# Patient Record
Sex: FEMALE | Employment: UNEMPLOYED | URBAN - METROPOLITAN AREA
[De-identification: names, ages, dates, MRNs, and addresses within clinical notes are randomized per-mention and may not be internally consistent; named-entity substitution may affect disease eponyms.]

---

## 2020-01-30 ENCOUNTER — OFFICE VISIT (OUTPATIENT)
Dept: FAMILY MEDICINE CLINIC | Age: 45
End: 2020-01-30

## 2020-01-30 VITALS
RESPIRATION RATE: 16 BRPM | SYSTOLIC BLOOD PRESSURE: 112 MMHG | OXYGEN SATURATION: 97 % | TEMPERATURE: 97 F | DIASTOLIC BLOOD PRESSURE: 80 MMHG | HEART RATE: 77 BPM

## 2020-01-30 DIAGNOSIS — I10 ESSENTIAL HYPERTENSION: ICD-10-CM

## 2020-01-30 DIAGNOSIS — G80.9 CEREBRAL PALSY, UNSPECIFIED TYPE (HCC): ICD-10-CM

## 2020-01-30 DIAGNOSIS — D50.9 IRON DEFICIENCY ANEMIA, UNSPECIFIED IRON DEFICIENCY ANEMIA TYPE: Primary | ICD-10-CM

## 2020-01-30 RX ORDER — HYDROCHLOROTHIAZIDE 25 MG/1
25 TABLET ORAL DAILY
COMMUNITY
End: 2020-10-02 | Stop reason: ALTCHOICE

## 2020-01-30 RX ORDER — POLYETHYLENE GLYCOL 3350 17 G/17G
17 POWDER, FOR SOLUTION ORAL DAILY
COMMUNITY

## 2020-01-30 RX ORDER — OLANZAPINE 10 MG/1
10 TABLET ORAL
COMMUNITY
End: 2021-02-02

## 2020-01-30 RX ORDER — FLUVOXAMINE MALEATE 25 MG/1
25 TABLET ORAL 2 TIMES DAILY
COMMUNITY
End: 2021-02-02

## 2020-01-30 RX ORDER — PEDIATRIC MULTIVITAMIN NO.17
1 TABLET,CHEWABLE ORAL DAILY
COMMUNITY

## 2020-01-30 RX ORDER — PHENOL/SODIUM PHENOLATE
20 AEROSOL, SPRAY (ML) MUCOUS MEMBRANE DAILY
COMMUNITY

## 2020-01-30 NOTE — PROGRESS NOTES
Assessment/Plan:    Diagnoses and all orders for this visit:    1. Iron deficiency anemia, unspecified iron deficiency anemia type    2. Essential hypertension    3. Cerebral palsy, unspecified type [facility])        The plan was discussed with the patient. The patient verbalized understanding and is in agreement with the plan. All medication potential side effects were discussed with the patient. Health Maintenance:   Health Maintenance   Topic Date Due    DTaP/Tdap/Td series (1 - Tdap) 08/24/1985    PAP AKA CERVICAL CYTOLOGY  06/20/2019    Influenza Age 5 to Adult  08/01/2019    Pneumococcal 0-64 years  Aged 389 Deena Sawyer is a 38 y.o.  female and presents with New Patient     Subjective:  Pt is here to establish care. He is a resident of [facility]. Has CP, in wheelchair. Has iron def anemia (new dx) and 10lb wt loss (over several months). Seeing GI for this issue, Hank Babb. Has constipation, which is treated with miralax and lactulose. Seeing heme/onc.      htn - on hctz. bp is good. ROS: per staff  Constitutional: + recent weight change. No f/c. Skin: No rashes, change in nails/hair, itching   HENT:  No hearing loss. No nasal congestion/discharge. Respiratory: No cough/sputum, dyspnea, wheezing. Gastointestinal:  No constipation/diarrhea. No melena/rectal bleeding. Heme: + h/o anemia. No easy bleeding/bruising. PMH:  Past Medical History:   Diagnosis Date    Anxiety     A[facility]ic [facility] [address]     Autism     Hydrocephalus ([facility] [address]     OCD (obsessive compulsive disorder)        PSH:  History reviewed. No pertinent surgical history.      SH:  Social History     Tobacco Use    Smoking status: Never Smoker    Smokeless tobacco: Never Used   Substance Use Topics    Alcohol use: No    Drug use: Never       FH:  Family History   Family history unknown: Yes       Medications/Allergies:    Current Outpatient Medications:     pediatric multivitamins (ANIMAL SHAPE VITAMINS) chewable tablet, Take 1 Tab by mouth daily. , Disp: , Rfl:     OLANZapine (ZYPREXA) 10 mg tablet, Take 10 mg by mouth nightly., Disp: , Rfl:     fluvoxaMINE (LUVOX) 25 mg tablet, Take 25 mg by mouth every evening., Disp: , Rfl:     Omeprazole delayed release (PRILOSEC D/R) 20 mg tablet, Take 20 mg by mouth daily. , Disp: , Rfl:     polyethylene glycol (MIRALAX) 17 gram packet, Take 17 g by mouth daily. , Disp: , Rfl:     hydroCHLOROthiazide (HYDRODIURIL) 25 mg tablet, Take 25 mg by mouth daily. , Disp: , Rfl:     LACTULOSE PO, Take  by mouth., Disp: , Rfl:     HYDROcodone-acetaminophen (NORCO) 7.5-325 mg per tablet, Take 1 Tab by mouth every six (6) hours as needed for Pain. Max Daily Amount: 4 Tabs., Disp: 20 Tab, Rfl: 0    ibuprofen (MOTRIN) 200 mg tablet, Take 3 Tabs by mouth every six (6) hours as needed for Pain., Disp: 20 Tab, Rfl: 0    multivitamin (ONE A DAY) tablet, Take 1 Tab by mouth daily. , Disp: , Rfl:     LORazepam (ATIVAN) 1 mg tablet, Take  by mouth every four (4) hours as needed for Anxiety. , Disp: , Rfl:   No Known Allergies    Objective:  Visit Vitals  /80 (BP 1 Location: Left arm, BP Patient Position: Sitting)   Pulse 77   Temp 97 °F (36.1 °C) (Oral)   Resp 16   LMP  (LMP Unknown)   SpO2 97%      Constitutional: Well developed, nourished, no distress, alert   HENT: Would not cooperate for exam   Eyes: Conjunctiva normal. PERRL. Cardiovascular: S1, S2.  RRR. No murmurs/rubs. No thrills palpated. Pulmonary/Chest Wall: No abnormalities on inspection. Clear to auscultation bilaterally. No wheezing/rhonchi. Normal effort. GI: Soft, nontender, nondistended. Normal active bowel sounds.

## 2020-01-30 NOTE — PROGRESS NOTES
Jewel Burdick is a 38 y.o. female (: 1975) presenting to address:    Chief Complaint   Patient presents with    New Patient       Vitals:    20 1323   BP: 112/80   Pulse: 77   Resp: 16   Temp: 97 °F (36.1 °C)   TempSrc: Oral   SpO2: 97%       Hearing/Vision:   No exam data present    Learning Assessment:     Learning Assessment 2020   PRIMARY LEARNER Patient   BARRIERS PRIMARY LEARNER COGNITIVE   PRIMARY LANGUAGE ENGLISH   LEARNER PREFERENCE PRIMARY VIDEOS   ANSWERED BY Caregiver   RELATIONSHIP OTHER     Depression Screening:     3 most recent PHQ Screens 2020   Little interest or pleasure in doing things Not at all   Feeling down, depressed, irritable, or hopeless Not at all   Total Score PHQ 2 0     Fall Risk Assessment:     Fall Risk Assessment, last 12 mths 2020   Able to walk? No     Abuse Screening:     Abuse Screening Questionnaire 2020   Do you ever feel afraid of your partner? N   Are you in a relationship with someone who physically or mentally threatens you? N   Is it safe for you to go home? Y     Coordination of Care Questionaire:   1. Have you been to the ER, urgent care clinic since your last visit? Hospitalized since your last visit? NO    2. Have you seen or consulted any other health care providers outside of the [address] [place] [address] since your last visit? Include any pap smears or colon screening. YES, Hematology    Advanced Directive:   1. Do you have an Advanced Directive? NO    2. Would you like information on Advanced Directives?  NO

## 2020-08-17 ENCOUNTER — VIRTUAL VISIT (OUTPATIENT)
Dept: FAMILY MEDICINE CLINIC | Age: 45
End: 2020-08-17

## 2020-08-17 DIAGNOSIS — R10.9 ABDOMINAL PAIN, UNSPECIFIED ABDOMINAL LOCATION: Primary | ICD-10-CM

## 2020-08-17 DIAGNOSIS — I10 ESSENTIAL HYPERTENSION: ICD-10-CM

## 2020-08-17 DIAGNOSIS — R00.0 TACHYCARDIA: ICD-10-CM

## 2020-08-17 DIAGNOSIS — D50.9 IRON DEFICIENCY ANEMIA, UNSPECIFIED IRON DEFICIENCY ANEMIA TYPE: ICD-10-CM

## 2020-08-17 DIAGNOSIS — R11.2 NON-INTRACTABLE VOMITING WITH NAUSEA, UNSPECIFIED VOMITING TYPE: ICD-10-CM

## 2020-08-17 NOTE — PROGRESS NOTES
Neelima Chaudhari is a 38 y.o. female who was seen by synchronous (real-time) audio-video technology on 8/17/2020 for Vomiting and Hypertension      Assessment & Plan:   Diagnoses and all orders for this visit:    1. Abdominal pain, unspecified abdominal location,   2. Non-intractable vomiting with nausea, unspecified vomiting type,   3. Tachycardia-  ddx includes gastroenteritis vs covid vs ileus. Recommend ER for IVF, labs and imaging. 4. Essential hypertension- elevated today due to above  -     CBC W/O DIFF; Future  -     METABOLIC PANEL, COMPREHENSIVE; Future  -     LIPID PANEL; Future    5. Iron deficiency anemia, unspecified iron deficiency anemia type  -     CBC W/O DIFF; Future  -     IRON PROFILE; Future          Subjective:   Pt of [facility] with CP. History presented by staff due to pt's mental impairment. HTN - on hctz. Due for labs. bp running 144/106. Running high x 1 day. Her HR is 138. Pt refused her meds this morning. Last night she started vomiting. No fever. Nursing reports good bowel sounds. Has h/o ileus 2 years ago. No consipation/diarrhea. Nursing reports abd feels firm on L side. Prior to Admission medications    Medication Sig Start Date End Date Taking? Authorizing Provider   pediatric multivitamins (ANIMAL SHAPE VITAMINS) chewable tablet Take 1 Tab by mouth daily. Provider, Historical   OLANZapine (ZYPREXA) 10 mg tablet Take 10 mg by mouth nightly. Provider, Historical   fluvoxaMINE (LUVOX) 25 mg tablet Take 25 mg by mouth every evening. Provider, Historical   Omeprazole delayed release (PRILOSEC D/R) 20 mg tablet Take 20 mg by mouth daily. Provider, Historical   polyethylene glycol (MIRALAX) 17 gram packet Take 17 g by mouth daily. Provider, Historical   hydroCHLOROthiazide (HYDRODIURIL) 25 mg tablet Take 25 mg by mouth daily. Provider, Historical   LACTULOSE PO Take  by mouth.     Provider, Historical     Patient Active Problem List   Diagnosis Code  Essential hypertension I10    Iron deficiency anemia D50.9    Cerebral palsy (HCC) G80.9       Review of Systems   Gastrointestinal: Positive for abdominal pain and vomiting. Objective:   No flowsheet data found. [INSTRUCTIONS:  \"[x]\" Indicates a positive item  \"[]\" Indicates a negative item  -- DELETE ALL ITEMS NOT EXAMINED]    Constitutional: [x] Appears well-developed and well-nourished [x] No apparent distress      [] Abnormal -     Mental status: [x] Alert and awake  [x] Oriented to person/place/time [x] Able to follow commands    [] Abnormal -     Eyes:   EOM    [x]  Normal    [] Abnormal -   Sclera  [x]  Normal    [] Abnormal -          Discharge [x]  None visible   [] Abnormal -     HENT: [] Normocephalic, atraumatic  [] Abnormal -   [] Mouth/Throat: Mucous membranes are moist    External Ears [x] Normal  [] Abnormal -    Neck: [x] No visualized mass [] Abnormal -     Pulmonary/Chest: [x] Respiratory effort normal   [x] No visualized signs of difficulty breathing or respiratory distress        [] Abnormal -      Musculoskeletal:   [] Normal gait with no signs of ataxia         [] Normal range of motion of neck        [] Abnormal -     Neurological:        [] No Facial Asymmetry (Cranial nerve 7 motor function) (limited exam due to video visit)          [] No gaze palsy        [] Abnormal -          Skin:        [] No significant exanthematous lesions or discoloration noted on facial skin         [] Abnormal -            Psychiatric:       [] Normal Affect [] Abnormal -        [] No Hallucinations    Other pertinent observable physical exam findings:-    Abd: fullness on L side, per nursing    We discussed the expected course, resolution and complications of the diagnosis(es) in detail. Medication risks, benefits, costs, interactions, and alternatives were discussed as indicated. I advised her to contact the office if her condition worsens, changes or fails to improve as anticipated.  She expressed understanding with the diagnosis(es) and plan. [employer], who was evaluated through a patient-initiated, synchronous (real-time) audio-video encounter, and/or her healthcare decision maker, is aware that it is a billable service, with coverage as determined by her insurance carrier. She provided verbal consent to proceed: Yes, and patient identification was verified. It was conducted pursuant to the emergency declaration under the [address] [place] [address] and the [employer] and [employer] Act. A caregiver was present when appropriate. Ability to conduct physical exam was limited. I was at home. The patient was at home.       Timmy Pugh MD

## 2020-08-25 PROBLEM — N13.9 OBSTRUCTIVE UROPATHY: Status: ACTIVE | Noted: 2020-08-25

## 2020-09-04 ENCOUNTER — VIRTUAL VISIT (OUTPATIENT)
Dept: FAMILY MEDICINE CLINIC | Age: 45
End: 2020-09-04

## 2020-09-04 DIAGNOSIS — A41.51 SEPSIS DUE TO ESCHERICHIA COLI, UNSPECIFIED WHETHER ACUTE ORGAN DYSFUNCTION PRESENT (HCC): ICD-10-CM

## 2020-09-04 DIAGNOSIS — N13.9 OBSTRUCTIVE UROPATHY: Primary | ICD-10-CM

## 2020-09-04 DIAGNOSIS — N20.0 NEPHROLITHIASIS: ICD-10-CM

## 2020-09-04 NOTE — PROGRESS NOTES
Angelo Anthony is a 54 y.o. female who was seen by synchronous (real-time) audio-video technology on 9/4/2020 for Hospital Follow Up      [address] [place], [place] [address]way:   Diagnoses and all orders for this visit:    1. Obstructive uropathy    2. Sepsis due to Escherichia coli, unspecified whether acute organ dysfunction present ([address]    3. Nephrolithiasis        -much improved. F/u with urology as scheduled. Pt now at baseline. Subjective:     Pt in hospital 8/17-8/26 for e coli sepsis from UTI due to obstructive nephrolithiasis with hydronephrosis. Underwent stent placement. Treated with rocephin. Has urology appt 9/9 for stent removal.  No longer having abd pain per caretaker. Eating and voiding normally. Prior to Admission medications    Medication Sig Start Date End Date Taking? Authorizing Provider   pediatric multivitamins (ANIMAL SHAPE VITAMINS) chewable tablet Take 1 Tab by mouth daily. Provider, Historical   OLANZapine (ZYPREXA) 10 mg tablet Take 10 mg by mouth nightly. Provider, Historical   fluvoxaMINE (LUVOX) 25 mg tablet Take 25 mg by mouth every evening. Provider, Historical   Omeprazole delayed release (PRILOSEC D/R) 20 mg tablet Take 20 mg by mouth daily. Provider, Historical   polyethylene glycol (MIRALAX) 17 gram packet Take 17 g by mouth daily. Provider, Historical   hydroCHLOROthiazide (HYDRODIURIL) 25 mg tablet Take 25 mg by mouth daily. Provider, Historical   LACTULOSE PO Take  by mouth. Provider, Historical     Patient Active Problem List   Diagnosis Code    Essential hypertension I10    Iron deficiency anemia D50.9    Cerebral palsy ([address] G80.9    Obstructive uropathy N13.9       Review of Systems   Constitutional: Negative for chills and fever. Genitourinary: Negative.         Objective:     Patient-Reported Vitals 8/17/2020   Patient-Reported Pulse 138   Patient-Reported Temperature 97.8   Patient-Reported SpO2 96   Patient-Reported Systolic  949   Patient-Reported Diastolic 365        [INSTRUCTIONS:  \"[x]\" Indicates a positive item  \"[]\" Indicates a negative item  -- DELETE ALL ITEMS NOT EXAMINED]    Constitutional: [x] Appears well-developed and well-nourished [x] No apparent distress      [] Abnormal -     Mental status: [x] Alert and awake  [x] Oriented to person/place/time [x] Able to follow commands    [] Abnormal -     Eyes:   EOM    [x]  Normal    [] Abnormal -   Sclera  [x]  Normal    [] Abnormal -          Discharge [x]  None visible   [] Abnormal -     HENT: [x] Normocephalic, atraumatic  [] Abnormal -   [x] Mouth/Throat: Mucous membranes are moist    External Ears [x] Normal  [] Abnormal -    Neck: [x] No visualized mass [] Abnormal -     Pulmonary/Chest: [x] Respiratory effort normal   [x] No visualized signs of difficulty breathing or respiratory distress        [] Abnormal -      Musculoskeletal:   [] Normal gait with no signs of ataxia         [] Normal range of motion of neck        [] Abnormal -     Neurological:        [] No Facial Asymmetry (Cranial nerve 7 motor function) (limited exam due to video visit)          [] No gaze palsy        [] Abnormal -          Skin:        [] No significant exanthematous lesions or discoloration noted on facial skin         [] Abnormal -            Psychiatric:       [x] Normal Affect [] Abnormal -        [x] No Hallucinations    Other pertinent observable physical exam findings:-        We discussed the expected course, resolution and complications of the diagnosis(es) in detail. Medication risks, benefits, costs, interactions, and alternatives were discussed as indicated. I advised her to contact the office if her condition worsens, changes or fails to improve as anticipated. She expressed understanding with the diagnosis(es) and plan.        [employer], who was evaluated through a patient-initiated, synchronous (real-time) audio-video encounter, and/or her healthcare decision maker, is aware that it is a billable service, with coverage as determined by her insurance carrier. She provided verbal consent to proceed: Yes, and patient identification was verified. It was conducted pursuant to the emergency declaration under the [address] [place] [address] authority and the [employer] Act. A caregiver was present when appropriate. Ability to conduct physical exam was limited. I was at home. The patient was at home.       Obed Boudreaux MD

## 2020-10-02 ENCOUNTER — TELEPHONE (OUTPATIENT)
Dept: FAMILY MEDICINE CLINIC | Age: 45
End: 2020-10-02

## 2020-10-02 RX ORDER — LOSARTAN POTASSIUM 25 MG/1
25 TABLET ORAL DAILY
COMMUNITY
Start: 2020-10-02

## 2020-10-02 NOTE — TELEPHONE ENCOUNTER
Received vm from [facility] stating that patient was discharged from [facility] yesterday on 10/1/20 and was d/c with 2 blood pressure medications on her medication list HCTZ 25 mg and Losartan 25 mg however they need clarification if she is suppose to take both or not and if not please fax something over stating to discontinue one of them. After reviewing her chart it looks like today you d/c the HCTZ on med list and add the losartan. However on discharge summary it states to continue HCTZ and does not mention the losartan. Please advise.      Fax number [phone]

## 2020-10-02 NOTE — TELEPHONE ENCOUNTER
The message I got was that she had been on losartan 25mg, not hctz 25mg. In fax pile, there should be the order to d/c hctz 25mg. No change to losartan.

## 2020-10-06 ENCOUNTER — VIRTUAL VISIT (OUTPATIENT)
Dept: FAMILY MEDICINE CLINIC | Age: 45
End: 2020-10-06
Payer: MEDICAID

## 2020-10-06 DIAGNOSIS — A41.51 SEPSIS DUE TO ESCHERICHIA COLI WITH ACUTE ORGAN DYSFUNCTION WITHOUT SEPTIC SHOCK, UNSPECIFIED TYPE (HCC): Primary | ICD-10-CM

## 2020-10-06 DIAGNOSIS — U07.1 COVID-19: ICD-10-CM

## 2020-10-06 DIAGNOSIS — R65.20 SEPSIS DUE TO ESCHERICHIA COLI WITH ACUTE ORGAN DYSFUNCTION WITHOUT SEPTIC SHOCK, UNSPECIFIED TYPE (HCC): Primary | ICD-10-CM

## 2020-10-06 DIAGNOSIS — R63.0 LACK OF APPETITE: ICD-10-CM

## 2020-10-06 PROCEDURE — 99213 OFFICE O/P EST LOW 20 MIN: CPT | Performed by: INTERNAL MEDICINE

## 2020-10-06 NOTE — PROGRESS NOTES
Porter Viveros is a 54 y.o. female who was seen by synchronous (real-time) audio-video technology on 10/6/2020 for Hospital Follow Up        [address] [place] [address]:   Diagnoses and all orders for this visit:    1. Sepsis due to Escherichia coli with acute organ dysfunction without septic shock, unspecified type ([facility] [address]    2. COVID-19- suspect lack of taste/smell contributing to #3.    3. Lack of appetite  - boost supplements qid prn poor intake      Subjective:   Pt recently hospitalized for sepsis 2/2 UTI/ureterolithiasis (pansensitive e coli). Pt underwent stent placement and treated with keflex (finishes 10/9). Also positive for covid. Pt initially hypotensive and bp meds were held. Per caretaker, her vitals are stable. However, pt has lack of appetite. She is putting food in mouth but then not swallowing. No episodes of choking. Prior to Admission medications    Medication Sig Start Date End Date Taking? Authorizing Provider   losartan (COZAAR) 25 mg tablet Take 1 Tab by mouth daily. 10/2/20   Theron Gonsalez MD   zonisamide (Zonegran) 100 mg capsule Take  by mouth daily. Provider, Historical   glycopyrrolate (ROBINUL) 1 mg tablet Take 1 mg by mouth three (3) times daily. Provider, Historical   ferrous sulfate 325 mg (65 mg iron) tablet Take  by mouth Daily (before breakfast). Provider, Historical   pediatric multivitamins (ANIMAL SHAPE VITAMINS) chewable tablet Take 1 Tab by mouth daily. Provider, Historical   OLANZapine (ZYPREXA) 10 mg tablet Take 10 mg by mouth nightly. Provider, Historical   fluvoxaMINE (LUVOX) 25 mg tablet Take 25 mg by mouth every evening. Provider, Historical   Omeprazole delayed release (PRILOSEC D/R) 20 mg tablet Take 20 mg by mouth daily. Provider, Historical   polyethylene glycol (MIRALAX) 17 gram packet Take 17 g by mouth daily. Provider, Historical   LACTULOSE PO Take  by mouth.     Provider, Historical     Patient Active Problem List   Diagnosis Code  Essential hypertension I10    Iron deficiency anemia D50.9    Cerebral palsy (HCC) G80.9    Obstructive uropathy N13.9       Review of Systems   Constitutional: Negative for chills and fever. Respiratory: Negative for cough, shortness of breath and wheezing. Gastrointestinal: Negative for diarrhea.        Objective:     Patient-Reported Vitals 8/17/2020   Patient-Reported Pulse 138   Patient-Reported Temperature 97.8   Patient-Reported SpO2 96   Patient-Reported Systolic  236   Patient-Reported Diastolic 882        [INSTRUCTIONS:  \"[x]\" Indicates a positive item  \"[]\" Indicates a negative item  -- DELETE ALL ITEMS NOT EXAMINED]    Constitutional: [x] Appears well-developed and well-nourished [x] No apparent distress      [] Abnormal -     Mental status: [x] Alert and awake  [x] Oriented to person/place/time [x] Able to follow commands    [] Abnormal -     Eyes:   EOM    [x]  Normal    [] Abnormal -   Sclera  [x]  Normal    [] Abnormal -          Discharge [x]  None visible   [] Abnormal -     HENT: [x] Normocephalic, atraumatic  [] Abnormal -   [x] Mouth/Throat: Mucous membranes are moist    External Ears [x] Normal  [] Abnormal -    Neck: [x] No visualized mass [] Abnormal -     Pulmonary/Chest: [x] Respiratory effort normal   [x] No visualized signs of difficulty breathing or respiratory distress        [] Abnormal -      Musculoskeletal:   [] Normal gait with no signs of ataxia         [] Normal range of motion of neck        [] Abnormal -     Neurological:        [] No Facial Asymmetry (Cranial nerve 7 motor function) (limited exam due to video visit)          [] No gaze palsy        [] Abnormal -          Skin:        [] No significant exanthematous lesions or discoloration noted on facial skin         [] Abnormal -            Psychiatric:       [x] Normal Affect [] Abnormal -        [x] No Hallucinations    Other pertinent observable physical exam findings:-        We discussed the expected course, resolution and complications of the diagnosis(es) in detail. Medication risks, benefits, costs, interactions, and alternatives were discussed as indicated. I advised her to contact the office if her condition worsens, changes or fails to improve as anticipated. She expressed understanding with the diagnosis(es) and plan. [employer], who was evaluated through a patient-initiated, synchronous (real-time) audio-video encounter, and/or her healthcare decision maker, is aware that it is a billable service, with coverage as determined by her insurance carrier. She provided verbal consent to proceed: Yes, and patient identification was verified. It was conducted pursuant to the emergency declaration under the [facility], [address] [place] [address] authority and the [employer] Act. A caregiver was present when appropriate. Ability to conduct physical exam was limited. I was in the office. The patient was at home.       Valeriano Carrasco MD

## 2020-11-25 ENCOUNTER — HOSPITAL ENCOUNTER (OUTPATIENT)
Dept: PREADMISSION TESTING | Age: 45
Discharge: HOME | End: 2020-11-25
Payer: MEDICAID

## 2020-11-25 ENCOUNTER — TRANSCRIBE ORDER (OUTPATIENT)
Dept: REGISTRATION | Age: 45
End: 2020-11-25

## 2020-11-25 DIAGNOSIS — Z01.812 BLOOD TESTS PRIOR TO TREATMENT OR PROCEDURE: Primary | ICD-10-CM

## 2020-11-25 DIAGNOSIS — Z01.812 BLOOD TESTS PRIOR TO TREATMENT OR PROCEDURE: ICD-10-CM

## 2020-11-25 DIAGNOSIS — Z20.828 EXPOSURE TO SARS-ASSOCIATED CORONAVIRUS: ICD-10-CM

## 2020-11-25 PROCEDURE — 87635 SARS-COV-2 COVID-19 AMP PRB: CPT

## 2020-11-27 LAB — SARS-COV-2, COV2NT: NOT DETECTED

## 2020-12-02 ENCOUNTER — HOSPITAL ENCOUNTER (OUTPATIENT)
Age: 45
Setting detail: OUTPATIENT SURGERY
Discharge: HOME | End: 2020-12-02
Attending: UROLOGY | Admitting: UROLOGY
Payer: MEDICAID

## 2020-12-02 ENCOUNTER — ANESTHESIA EVENT (OUTPATIENT)
Dept: SURGERY | Age: 45
End: 2020-12-02
Payer: MEDICAID

## 2020-12-02 ENCOUNTER — ANESTHESIA (OUTPATIENT)
Dept: SURGERY | Age: 45
End: 2020-12-02
Payer: MEDICAID

## 2020-12-02 ENCOUNTER — APPOINTMENT (OUTPATIENT)
Dept: GENERAL RADIOLOGY | Age: 45
End: 2020-12-02
Attending: UROLOGY
Payer: MEDICAID

## 2020-12-02 VITALS
TEMPERATURE: 98.5 F | RESPIRATION RATE: 16 BRPM | HEART RATE: 85 BPM | OXYGEN SATURATION: 99 % | SYSTOLIC BLOOD PRESSURE: 101 MMHG | DIASTOLIC BLOOD PRESSURE: 50 MMHG

## 2020-12-02 PROCEDURE — 74018 RADEX ABDOMEN 1 VIEW: CPT

## 2020-12-02 RX ORDER — CEFAZOLIN SODIUM 2 G/50ML
2 SOLUTION INTRAVENOUS
Status: DISCONTINUED | OUTPATIENT
Start: 2020-12-02 | End: 2020-12-02 | Stop reason: HOSPADM

## 2020-12-02 NOTE — PERIOP NOTES
Patient is with her caregiver Derick Grimeskorimagda, who stays she is her nurse, caregiver and legal guardian. Patient lives in a group home. Will continue monitoring.

## 2020-12-02 NOTE — PROGRESS NOTES
Progress Note    Patient had E coli UTI diagnosed prior to ESWL procedure. Unfortunately antibiotic therapy was just started yesterday. I feel to treat this patient's stone today will put her at risk for Sepsis. She has been rescheduled multiple times, but that is not good excuse to adhere to standard practices. Will proceed with ESWL next week as previously planned while on Augmentin. .  Today's procedure has been cancelled.     Alisson Torre MD

## 2020-12-10 ENCOUNTER — ANESTHESIA EVENT (OUTPATIENT)
Dept: SURGERY | Age: 45
End: 2020-12-10
Payer: MEDICAID

## 2020-12-11 ENCOUNTER — HOSPITAL ENCOUNTER (OUTPATIENT)
Age: 45
Setting detail: OUTPATIENT SURGERY
Discharge: NURSING, REHAB OR LONG-TERM CARE | End: 2020-12-11
Attending: UROLOGY | Admitting: UROLOGY
Payer: MEDICAID

## 2020-12-11 ENCOUNTER — APPOINTMENT (OUTPATIENT)
Dept: GENERAL RADIOLOGY | Age: 45
End: 2020-12-11
Attending: UROLOGY
Payer: MEDICAID

## 2020-12-11 ENCOUNTER — ANESTHESIA (OUTPATIENT)
Dept: SURGERY | Age: 45
End: 2020-12-11
Payer: MEDICAID

## 2020-12-11 VITALS
OXYGEN SATURATION: 100 % | WEIGHT: 155.7 LBS | HEIGHT: 61 IN | SYSTOLIC BLOOD PRESSURE: 131 MMHG | RESPIRATION RATE: 18 BRPM | BODY MASS INDEX: 29.39 KG/M2 | HEART RATE: 69 BPM | DIASTOLIC BLOOD PRESSURE: 92 MMHG | TEMPERATURE: 98.1 F

## 2020-12-11 LAB
APPEARANCE UR: ABNORMAL
APPEARANCE UR: CLEAR
BACTERIA URNS QL MICRO: ABNORMAL /HPF
BACTERIA URNS QL MICRO: ABNORMAL /HPF
BILIRUB UR QL: NEGATIVE
BILIRUB UR QL: NEGATIVE
CAOX CRY URNS QL MICRO: ABNORMAL
COLOR UR: YELLOW
COLOR UR: YELLOW
EPITH CASTS URNS QL MICRO: ABNORMAL /LPF (ref 0–5)
EPITH CASTS URNS QL MICRO: ABNORMAL /LPF (ref 0–5)
GLUCOSE UR STRIP.AUTO-MCNC: NEGATIVE MG/DL
GLUCOSE UR STRIP.AUTO-MCNC: NEGATIVE MG/DL
HCG SERPL QL: NEGATIVE
HGB UR QL STRIP: ABNORMAL
HGB UR QL STRIP: ABNORMAL
KETONES UR QL STRIP.AUTO: NEGATIVE MG/DL
KETONES UR QL STRIP.AUTO: NEGATIVE MG/DL
LEUKOCYTE ESTERASE UR QL STRIP.AUTO: ABNORMAL
LEUKOCYTE ESTERASE UR QL STRIP.AUTO: ABNORMAL
NITRITE UR QL STRIP.AUTO: POSITIVE
NITRITE UR QL STRIP.AUTO: POSITIVE
PH UR STRIP: 6.5 [PH] (ref 5–8)
PH UR STRIP: 7 [PH] (ref 5–8)
PROT UR STRIP-MCNC: ABNORMAL MG/DL
PROT UR STRIP-MCNC: NEGATIVE MG/DL
RBC #/AREA URNS HPF: ABNORMAL /HPF (ref 0–5)
RBC #/AREA URNS HPF: ABNORMAL /HPF (ref 0–5)
SP GR UR REFRACTOMETRY: 1.01 (ref 1–1.03)
SP GR UR REFRACTOMETRY: 1.01 (ref 1–1.03)
UROBILINOGEN UR QL STRIP.AUTO: 0.2 EU/DL (ref 0.2–1)
UROBILINOGEN UR QL STRIP.AUTO: 0.2 EU/DL (ref 0.2–1)
WBC URNS QL MICRO: ABNORMAL /HPF (ref 0–4)
WBC URNS QL MICRO: ABNORMAL /HPF (ref 0–4)

## 2020-12-11 PROCEDURE — 87186 SC STD MICRODIL/AGAR DIL: CPT

## 2020-12-11 PROCEDURE — 87086 URINE CULTURE/COLONY COUNT: CPT

## 2020-12-11 PROCEDURE — 81001 URINALYSIS AUTO W/SCOPE: CPT

## 2020-12-11 PROCEDURE — 74018 RADEX ABDOMEN 1 VIEW: CPT

## 2020-12-11 PROCEDURE — 84703 CHORIONIC GONADOTROPIN ASSAY: CPT

## 2020-12-11 PROCEDURE — 87077 CULTURE AEROBIC IDENTIFY: CPT

## 2020-12-11 RX ORDER — SODIUM CHLORIDE, SODIUM LACTATE, POTASSIUM CHLORIDE, CALCIUM CHLORIDE 600; 310; 30; 20 MG/100ML; MG/100ML; MG/100ML; MG/100ML
25 INJECTION, SOLUTION INTRAVENOUS CONTINUOUS
Status: DISCONTINUED | OUTPATIENT
Start: 2020-12-11 | End: 2020-12-11 | Stop reason: HOSPADM

## 2020-12-11 RX ORDER — FAMOTIDINE 20 MG/1
20 TABLET, FILM COATED ORAL ONCE
Status: DISCONTINUED | OUTPATIENT
Start: 2020-12-11 | End: 2020-12-11 | Stop reason: HOSPADM

## 2020-12-11 RX ORDER — CEFAZOLIN SODIUM 2 G/50ML
2 SOLUTION INTRAVENOUS ONCE
Status: DISCONTINUED | OUTPATIENT
Start: 2020-12-11 | End: 2020-12-11 | Stop reason: HOSPADM

## 2020-12-11 NOTE — PROGRESS NOTES
Procedure cancelled. Prior Urine culture +, completed course of Augmentin. First straight cath + with epis. Personally repeated 2nd straight cath, remains positive. Will send for repeat culture. Will likely need IV ABX and URS while on Abx. Pt's family will be contacted regarding followup.  [address] notified    Dixie Hernandez MD

## 2020-12-11 NOTE — H&P
Follow-up Visit              Encounter Diagnoses       ICD-10-CM ICD-9-CM   1. Obstructive uropathy  N13.9 599.60            ASSESSMENT & PLAN:   1. Ureteral Stone               CT Abd Pelv 8/17/20: Mild right hydronephrosis and hydroureter secondary to an obstructing calculus in the right mid ureter measuring 8 x 7 x 8 mm. S/p cystoscopy, right retrograde, right ureteral stent on 8/17/20. KUB 8/22/20: Right ureteral stent appears unchanged, adequately positioned              Reviewed KUB 10/22/20: 8 mm right proximal stone. Stent in place              Discussed that she needs the stone treated prior to stent removal. Recommend right ESWL to treat- risks and benefits reviewed               Will remove the stent after stone is treated. Will need consent from POA for surgery       12/11/20 Update:    Pt seen and examined. KUB - stone visible. Completed course of Augmentin yesterday. Straight cathed today for UA and culture. If negative, will proceed with right ESWL. Consent signed. Preop abx ordered. All family questions answered. Meg Montez MD             Follow-up and Dispositions                   Chief Complaint   Patient presents with    Kidney Stone       stent removal          HPI: Para Ramírez is a 54 y.o. PATIENT REFUSED female with h/o cerebral palsy, intellectual disability (profound), hydrocephalus, autism who presents today in follow up for an established diagnosis of ureteral stone. Pt was admitted to the hospital from 8/17-8/26/20 for sepsis secondary to E.coli UTI and obstructing stone. CT scan on 8/17/20 showed mild right hydronephrosis and hydroureter secondary to an obstructing calculus in the right mid ureter measuring 8 x 7 x 8 mm. Also noted high-grade small bowel obstruction. Other findings outlined below. S/p cysto placement of NG tube, cystoscopy, right retrograde, right ureteral stent on 8/17/20.  UTI was treated with Rocephin. Pt was last seen on 9/9/20. Caregiver is present at appointment. Per caregiver pt reports some discomfort with stent in place. She has complete incontinence.      No bothersome Sx reported at today's visit.      Pt is not on any blood thinner.      Pt ambulates with a wheelchair.         LABS / IMAGING:  CT Abd Pelv W Cont 8/17/20  KIDNEYS: Left kidney is unremarkable. Right kidney demonstrates mild hydronephrosis and hydroureter secondary to an obstructing calculus in the right mid ureter measuring 8 x 7 x 8mm. No other ureteral calculi seen.     IMPRESSION    There is high-grade small bowel obstruction with transition zone in the left lower quadrant without pneumatosis or portal venous gas or free air. Mild right hydronephrosis and hydroureter secondary to an obstructing calculus in the right mid ureter measuring 8 x 7 x 8 mm. Complex left adnexal cyst and a 6 cm septated right adnexal cyst.     AUA Assessment Score:   ;    AUA Bother Rating:              Current Outpatient Medications   Medication Sig Dispense Refill    losartan (COZAAR) 25 mg tablet Take 1 Tab by mouth daily.        zonisamide (Zonegran) 100 mg capsule Take  by mouth daily.        glycopyrrolate (ROBINUL) 1 mg tablet Take 1 mg by mouth three (3) times daily.        ferrous sulfate 325 mg (65 mg iron) tablet Take  by mouth Daily (before breakfast).         pediatric multivitamins (ANIMAL SHAPE VITAMINS) chewable tablet Take 1 Tab by mouth daily.        OLANZapine (ZYPREXA) 10 mg tablet Take 10 mg by mouth nightly.        fluvoxaMINE (LUVOX) 25 mg tablet Take 25 mg by mouth every evening.        Omeprazole delayed release (PRILOSEC D/R) 20 mg tablet Take 20 mg by mouth daily.        polyethylene glycol (MIRALAX) 17 gram packet Take 17 g by mouth daily.        LACTULOSE PO Take  by mouth.             All:  No Known Allergies          Past Medical History:   Diagnosis Date    Anxiety      Ataxic cerebral palsy (HCC)    Autism      Hydrocephalus (HCC)      Kidney stone      OCD (obsessive compulsive disorder)      UTI (urinary tract infection)                 Past Surgical History:   Procedure Laterality Date    CYSTOSCOPY,INSERT URETERAL STENT             Review of Systems  Constitutional: Fever: No  Skin: Rash: No  HEENT: Hearing difficulty: No  Eyes: Blurred vision: No  Cardiovascular: Chest pain: No  Respiratory: Shortness of breath: No  Gastrointestinal: Nausea/vomiting: No  Musculoskeletal: Back pain: No  Neurological: Weakness: No  Psychological: Memory loss: No  Comments/additional findings:      Any elements of the PMFSHx, ROS, or preliminary elements of the HPI that were entered by a medical assistant have been reviewed in full.     PHYSICAL EXAM:    Visit Vitals  Ht 5' 1\" (1.549 m)   Wt 168 lb (76.2 kg)   BMI 31.74 kg/m²      Constitutional: WDWN, Pleasant and appropriate affect, No acute distress. CV:  No peripheral swelling noted  Respiratory: No respiratory distress or difficulties  Skin: No jaundice. Neuro/Psych:  Alert and oriented x 3. Affect appropriate.         Body mass index is 31.74 kg/m². Patient's BMI is out of the normal parameters. Information about BMI was given and patient was advised to follow-up with their PCP for further management. Labs Today:      Results for orders placed or performed in visit on 10/22/20   AMB POC XRAY ABDOMEN 1 VIEW     Impression     [facility] [address] [place] [address] Road in office today, 10/23/2020     Previous KUB: YES     Reason for Examination:     Patient presents with:  Kidney Stone: stent removal         Renal silhouette is well visualized.        Renal stones seen? NO        Ureteral stone? YES       Bowel gas pattern normal?  YES  Acute osseus abnormality? NO  Phleboliths seen in pelvis? NO     Impression:   Right ureteral stent in place.  8 mm stone right proximal ureter.        Read by:   Ricardo Booth M.D., F.A.C.S. on 10/23/2020

## 2020-12-11 NOTE — PERIOP NOTES
Pre-Op Summary    Pt arrived via medical transport and is disoriented. Patient with unsteady gait with wheelchair assistive devices. Unable to start peripheral IV due to patient being uncooperative. Dr. Clau Dooley informed. IV to be started in OR. Visit Vitals  BP (!) 131/92 (BP 1 Location: Left arm, BP Patient Position: At rest)   Pulse 69   Temp 98.1 °F (36.7 °C)   Resp 18   Ht 5' 1\" (1.549 m)   Wt 70.6 kg (155 lb 11.2 oz)   SpO2 100%   BMI 29.42 kg/m²         Patients belongings are located [facility]. Patient's point of contact is caregiver/nurse Lizbeth Diego  and their contact number is: [phone]. They will be leaving and coming back. They are able to receive medication information. They will be their ride home.

## 2020-12-11 NOTE — ANESTHESIA PREPROCEDURE EVALUATION
Anesthetic History   No history of anesthetic complications            Review of Systems / Medical History  Patient summary reviewed and pertinent labs reviewed    Pulmonary  Within defined limits                 Neuro/Psych     seizures         Cardiovascular    Hypertension              Exercise tolerance: <4 METS  Comments: CP    Unable to start IV 2* pt combativeness. Plan today is mask induction to allow IV start.      GI/Hepatic/Renal     GERD: well controlled           Endo/Other             Other Findings              Physical Exam    Airway  Mallampati: IV  TM Distance: 4 - 6 cm  Neck ROM: decreased range of motion   Mouth opening: Diminished (comment)     Cardiovascular    Rhythm: regular  Rate: normal         Dental    Dentition: Poor dentition     Pulmonary  Breath sounds clear to auscultation               Abdominal  GI exam deferred       Other Findings            Anesthetic Plan    ASA: 3  Anesthesia type: general          Induction: Intravenous and Inhalational  Anesthetic plan and risks discussed with: Patient and Healthcare power of

## 2020-12-14 LAB
BACTERIA SPEC CULT: ABNORMAL
CC UR VC: ABNORMAL
SERVICE CMNT-IMP: ABNORMAL

## 2021-01-29 ENCOUNTER — HOSPITAL ENCOUNTER (OUTPATIENT)
Dept: PREADMISSION TESTING | Age: 46
Discharge: HOME | End: 2021-01-29
Payer: MEDICAID

## 2021-01-29 ENCOUNTER — TRANSCRIBE ORDER (OUTPATIENT)
Dept: REGISTRATION | Age: 46
End: 2021-01-29

## 2021-01-29 DIAGNOSIS — Z20.828 EXPOSURE TO SARS-ASSOCIATED CORONAVIRUS: ICD-10-CM

## 2021-01-29 DIAGNOSIS — Z01.812 BLOOD TESTS PRIOR TO TREATMENT OR PROCEDURE: Primary | ICD-10-CM

## 2021-01-29 DIAGNOSIS — Z01.812 BLOOD TESTS PRIOR TO TREATMENT OR PROCEDURE: ICD-10-CM

## 2021-01-29 PROCEDURE — U0003 INFECTIOUS AGENT DETECTION BY NUCLEIC ACID (DNA OR RNA); SEVERE ACUTE RESPIRATORY SYNDROME CORONAVIRUS 2 (SARS-COV-2) (CORONAVIRUS DISEASE [COVID-19]), AMPLIFIED PROBE TECHNIQUE, MAKING USE OF HIGH THROUGHPUT TECHNOLOGIES AS DESCRIBED BY CMS-2020-01-R: HCPCS

## 2021-01-30 LAB — SARS-COV-2, COV2NT: NOT DETECTED

## 2021-02-02 ENCOUNTER — ANESTHESIA EVENT (OUTPATIENT)
Dept: SURGERY | Age: 46
End: 2021-02-02
Payer: MEDICAID

## 2021-02-02 RX ORDER — OLANZAPINE 20 MG/1
20 TABLET ORAL
COMMUNITY

## 2021-02-02 RX ORDER — FLUVOXAMINE MALEATE 50 MG/1
50 TABLET ORAL 2 TIMES DAILY
COMMUNITY

## 2021-02-02 RX ORDER — DIAZEPAM 5 MG/1
5 TABLET ORAL AS NEEDED
COMMUNITY
End: 2021-03-17 | Stop reason: SDUPTHER

## 2021-02-03 ENCOUNTER — APPOINTMENT (OUTPATIENT)
Dept: GENERAL RADIOLOGY | Age: 46
End: 2021-02-03
Attending: UROLOGY
Payer: MEDICAID

## 2021-02-03 ENCOUNTER — ANESTHESIA (OUTPATIENT)
Dept: SURGERY | Age: 46
End: 2021-02-03
Payer: MEDICAID

## 2021-02-03 ENCOUNTER — HOSPITAL ENCOUNTER (OUTPATIENT)
Age: 46
Setting detail: OUTPATIENT SURGERY
Discharge: HOME | End: 2021-02-03
Attending: UROLOGY | Admitting: UROLOGY
Payer: MEDICAID

## 2021-02-03 VITALS
OXYGEN SATURATION: 96 % | HEART RATE: 87 BPM | BODY MASS INDEX: 29.58 KG/M2 | SYSTOLIC BLOOD PRESSURE: 128 MMHG | HEIGHT: 62 IN | WEIGHT: 160.75 LBS | RESPIRATION RATE: 16 BRPM | TEMPERATURE: 97.3 F | DIASTOLIC BLOOD PRESSURE: 85 MMHG

## 2021-02-03 DIAGNOSIS — N20.1 RIGHT URETERAL CALCULUS: Primary | ICD-10-CM

## 2021-02-03 PROCEDURE — 77030018830 HC SOL IRR GLYC ICUM-A: Performed by: UROLOGY

## 2021-02-03 PROCEDURE — 76060000033 HC ANESTHESIA 1 TO 1.5 HR: Performed by: UROLOGY

## 2021-02-03 PROCEDURE — 74011000250 HC RX REV CODE- 250: Performed by: NURSE ANESTHETIST, CERTIFIED REGISTERED

## 2021-02-03 PROCEDURE — 74011250636 HC RX REV CODE- 250/636: Performed by: UROLOGY

## 2021-02-03 PROCEDURE — 77030012510 HC MSK AIRWY LMA TELE -B: Performed by: ANESTHESIOLOGY

## 2021-02-03 PROCEDURE — 76210000063 HC OR PH I REC FIRST 0.5 HR: Performed by: UROLOGY

## 2021-02-03 PROCEDURE — 76010000149 HC OR TIME 1 TO 1.5 HR: Performed by: UROLOGY

## 2021-02-03 PROCEDURE — 74018 RADEX ABDOMEN 1 VIEW: CPT

## 2021-02-03 PROCEDURE — 77030012961 HC IRR KT CYSTO/TUR ICUM -A: Performed by: UROLOGY

## 2021-02-03 PROCEDURE — 74011250636 HC RX REV CODE- 250/636: Performed by: NURSE ANESTHETIST, CERTIFIED REGISTERED

## 2021-02-03 PROCEDURE — 76210000021 HC REC RM PH II 0.5 TO 1 HR: Performed by: UROLOGY

## 2021-02-03 PROCEDURE — 77030032490 HC SLV COMPR SCD KNE COVD -B: Performed by: UROLOGY

## 2021-02-03 PROCEDURE — 00873 ANES LITHOTRP ESW WO WTR BTH: CPT | Performed by: ANESTHESIOLOGY

## 2021-02-03 PROCEDURE — 2709999900 HC NON-CHARGEABLE SUPPLY: Performed by: UROLOGY

## 2021-02-03 PROCEDURE — 77030040922 HC BLNKT HYPOTHRM STRY -A: Performed by: UROLOGY

## 2021-02-03 PROCEDURE — 74011000258 HC RX REV CODE- 258: Performed by: NURSE ANESTHETIST, CERTIFIED REGISTERED

## 2021-02-03 PROCEDURE — 00873 ANES LITHOTRP ESW WO WTR BTH: CPT | Performed by: NURSE ANESTHETIST, CERTIFIED REGISTERED

## 2021-02-03 PROCEDURE — 74011250636 HC RX REV CODE- 250/636: Performed by: ANESTHESIOLOGY

## 2021-02-03 RX ORDER — NITROFURANTOIN 25; 75 MG/1; MG/1
100 CAPSULE ORAL
COMMUNITY
End: 2022-02-14 | Stop reason: ALTCHOICE

## 2021-02-03 RX ORDER — SODIUM CHLORIDE, SODIUM LACTATE, POTASSIUM CHLORIDE, CALCIUM CHLORIDE 600; 310; 30; 20 MG/100ML; MG/100ML; MG/100ML; MG/100ML
25 INJECTION, SOLUTION INTRAVENOUS CONTINUOUS
Status: DISCONTINUED | OUTPATIENT
Start: 2021-02-03 | End: 2021-02-03 | Stop reason: HOSPADM

## 2021-02-03 RX ORDER — DEXAMETHASONE SODIUM PHOSPHATE 4 MG/ML
INJECTION, SOLUTION INTRA-ARTICULAR; INTRALESIONAL; INTRAMUSCULAR; INTRAVENOUS; SOFT TISSUE AS NEEDED
Status: DISCONTINUED | OUTPATIENT
Start: 2021-02-03 | End: 2021-02-03 | Stop reason: HOSPADM

## 2021-02-03 RX ORDER — FAMOTIDINE 20 MG/1
20 TABLET, FILM COATED ORAL ONCE
Status: DISCONTINUED | OUTPATIENT
Start: 2021-02-03 | End: 2021-02-03 | Stop reason: HOSPADM

## 2021-02-03 RX ORDER — MIDAZOLAM HYDROCHLORIDE 1 MG/ML
10 INJECTION, SOLUTION INTRAMUSCULAR; INTRAVENOUS ONCE
Status: COMPLETED | OUTPATIENT
Start: 2021-02-03 | End: 2021-02-03

## 2021-02-03 RX ORDER — ONDANSETRON 2 MG/ML
4 INJECTION INTRAMUSCULAR; INTRAVENOUS ONCE
Status: DISCONTINUED | OUTPATIENT
Start: 2021-02-03 | End: 2021-02-03 | Stop reason: HOSPADM

## 2021-02-03 RX ORDER — MIDAZOLAM HYDROCHLORIDE 1 MG/ML
INJECTION, SOLUTION INTRAMUSCULAR; INTRAVENOUS
Status: CANCELLED | OUTPATIENT
Start: 2021-02-03

## 2021-02-03 RX ORDER — SODIUM CHLORIDE 0.9 % (FLUSH) 0.9 %
5-40 SYRINGE (ML) INJECTION EVERY 8 HOURS
Status: DISCONTINUED | OUTPATIENT
Start: 2021-02-03 | End: 2021-02-03 | Stop reason: HOSPADM

## 2021-02-03 RX ORDER — SODIUM CHLORIDE 0.9 % (FLUSH) 0.9 %
5-40 SYRINGE (ML) INJECTION AS NEEDED
Status: DISCONTINUED | OUTPATIENT
Start: 2021-02-03 | End: 2021-02-03 | Stop reason: HOSPADM

## 2021-02-03 RX ORDER — HYDROCODONE BITARTRATE AND ACETAMINOPHEN 5; 325 MG/1; MG/1
1 TABLET ORAL
Qty: 8 TAB | Refills: 0 | Status: SHIPPED | OUTPATIENT
Start: 2021-02-03 | End: 2021-02-06

## 2021-02-03 RX ORDER — EPHEDRINE SULFATE/0.9% NACL/PF 50 MG/5 ML
SYRINGE (ML) INTRAVENOUS AS NEEDED
Status: DISCONTINUED | OUTPATIENT
Start: 2021-02-03 | End: 2021-02-03 | Stop reason: HOSPADM

## 2021-02-03 RX ORDER — NITROFURANTOIN 25; 75 MG/1; MG/1
100 CAPSULE ORAL 2 TIMES DAILY
Qty: 10 CAP | Refills: 0 | Status: SHIPPED | OUTPATIENT
Start: 2021-02-03 | End: 2021-02-08

## 2021-02-03 RX ORDER — CEFAZOLIN SODIUM 2 G/50ML
2 SOLUTION INTRAVENOUS
Status: COMPLETED | OUTPATIENT
Start: 2021-02-03 | End: 2021-02-03

## 2021-02-03 RX ORDER — SODIUM CHLORIDE, SODIUM LACTATE, POTASSIUM CHLORIDE, CALCIUM CHLORIDE 600; 310; 30; 20 MG/100ML; MG/100ML; MG/100ML; MG/100ML
25 INJECTION, SOLUTION INTRAVENOUS CONTINUOUS
Status: DISCONTINUED | OUTPATIENT
Start: 2021-02-03 | End: 2021-02-03 | Stop reason: SDUPTHER

## 2021-02-03 RX ORDER — ONDANSETRON 2 MG/ML
INJECTION INTRAMUSCULAR; INTRAVENOUS AS NEEDED
Status: DISCONTINUED | OUTPATIENT
Start: 2021-02-03 | End: 2021-02-03 | Stop reason: HOSPADM

## 2021-02-03 RX ADMIN — SODIUM CHLORIDE, SODIUM LACTATE, POTASSIUM CHLORIDE, AND CALCIUM CHLORIDE: 600; 310; 30; 20 INJECTION, SOLUTION INTRAVENOUS at 11:42

## 2021-02-03 RX ADMIN — PHENYLEPHRINE HYDROCHLORIDE 100 MCG: 10 INJECTION INTRAVENOUS at 12:31

## 2021-02-03 RX ADMIN — PHENYLEPHRINE HYDROCHLORIDE 100 MCG: 10 INJECTION INTRAVENOUS at 12:38

## 2021-02-03 RX ADMIN — Medication 10 MG: at 12:20

## 2021-02-03 RX ADMIN — Medication 10 MG: at 12:38

## 2021-02-03 RX ADMIN — CEFAZOLIN 2 G: 10 INJECTION, POWDER, FOR SOLUTION INTRAVENOUS at 12:17

## 2021-02-03 RX ADMIN — DEXAMETHASONE SODIUM PHOSPHATE 4 MG: 4 INJECTION, SOLUTION INTRA-ARTICULAR; INTRALESIONAL; INTRAMUSCULAR; INTRAVENOUS; SOFT TISSUE at 12:23

## 2021-02-03 RX ADMIN — Medication 10 MG: at 12:10

## 2021-02-03 RX ADMIN — MIDAZOLAM 6 MG: 1 INJECTION INTRAMUSCULAR; INTRAVENOUS at 10:17

## 2021-02-03 RX ADMIN — ONDANSETRON 4 MG: 2 SOLUTION INTRAMUSCULAR; INTRAVENOUS at 12:23

## 2021-02-03 RX ADMIN — PHENYLEPHRINE HYDROCHLORIDE 100 MCG: 10 INJECTION INTRAVENOUS at 12:22

## 2021-02-03 NOTE — PERIOP NOTES
Unable to obtain IV access x  2 nurses (4 attempts). Patient becoming combative. Dr. Thuy Butts (anesthesiologist notified).

## 2021-02-03 NOTE — PERIOP NOTES
Pt arrives from OR. Placed on monitors. VSS. Chart, MAR and anesthesia record reviewed. Will monitor status  Report to [facility]. Opportunity for questions offered, clarification provided.  VSS, pain managed  Contacted Bib Osorio and provided update info

## 2021-02-03 NOTE — ANESTHESIA PREPROCEDURE EVALUATION
Relevant Problems   CARDIOVASCULAR   (+) Essential hypertension      HEMATOLOGY   (+) Iron deficiency anemia       Anesthetic History   No history of anesthetic complications            Review of Systems / Medical History  Patient summary reviewed and pertinent labs reviewed    Pulmonary                   Neuro/Psych     seizures        Comments: Low function CP Cardiovascular    Hypertension                   GI/Hepatic/Renal     GERD           Endo/Other        Obesity     Other Findings              Physical Exam    Airway            Comments: Unable to eval Cardiovascular    Rhythm: regular  Rate: normal         Dental    Dentition: Poor dentition and Loose teeth     Pulmonary      Decreased breath sounds: bilateral           Abdominal  GI exam deferred       Other Findings            Anesthetic Plan    ASA: 2  Anesthesia type: general            Anesthetic plan and risks discussed with: Healthcare power of

## 2021-02-03 NOTE — H&P
Valerio Allen [place]  1975  Encounter date: 01/13/2021          History and Physical              Encounter Diagnoses       ICD-10-CM ICD-9-CM   1. Ureteral stone  N20.1 592.1   2. History of UTI  Z87.440 V13.02            ASSESSMENT & PLAN:   1. Ureteral Stone               CT Abd Pelv 8/17/20: Mild right hydronephrosis and hydroureter secondary to an obstructing calculus in the right mid ureter measuring 8 x 7 x 8 mm. S/p cystoscopy, right retrograde, right ureteral stent on 8/17/20. KUB 8/22/20: Right ureteral stent appears unchanged, adequately positioned              KUB 10/22/20: 8 mm right proximal stone. Stent in place              KUB 12/11/20: Unchanged 9 mm calculi in the left ureter. Stable appearing double-J ureteral stent. Urine sent for culture - will call patient with results and f/u with abx accordingly               Will have pt on Abx until surgery due to constant UTI              Will reschedule right ESWL once UCx result is reviewed         2. Recurrent UTI               UA today 1/13/21: 1+ blood and 3+ leuk               Urine sent for culture - will call patient with results and f/u with abx accordingly. Patient has chronic UTI secondary to stent. She has been maintained on            Follow-up and Dispositions    · Return for Will call with results.                 Chief Complaint   Patient presents with    Follow-up       Pt had stent placed on 8/17/20. Has been placed on antibiotics multiple times and nurse is concerned when will stent be removed.  Recurrent UTI       Pt is a poor historian and is in a medical nursing facility          HPI: Garima Cannon is a 54 y.o. PATIENT REFUSED female with h/o cerebral palsy, intellectual disability (profound), hydrocephalus, autism who presents today in follow up for an established diagnosis of ureteral stone.  Pt was admitted to the hospital from 8/17-8/26/20 for sepsis secondary to E.coli UTI and obstructing stone. CT scan on 8/17/20 showed mild right hydronephrosis and hydroureter secondary to an obstructing calculus in the right mid ureter measuring 8 x 7 x 8 mm. Also noted high-grade small bowel obstruction. Other findings outlined below. S/p cysto placement of NG tube, cystoscopy, right retrograde, right ureteral stent on 8/17/20. UTI was treated with Rocephin. Pt was seen on 9/9/20. Caregiver is present at appointment. Per caregiver pt reports some discomfort with stent in place. She has complete incontinence. Most recent KUB on 12/11/20 showed unchanged 9 mm calculi in the left ureter. Stable appearing double-J ureteral stent.      Pt was scheduled for right ESWL but surgery was cancelled due to recurrent UTI.      Pt is not on any blood thinner. Pt ambulates with a wheelchair.         LABS / IMAGING:     KUB 12/11/20   IMPRESSION:  1.  Unchanged 9 mm calculi in the left ureter. 2.  Stable appearing double-J ureteral stent. 3.  Nonobstructive bowel gas pattern with moderate burden of colonic stool.        CT Abd Pelv W Cont 8/17/20  KIDNEYS: Left kidney is unremarkable. Right kidney demonstrates mild hydronephrosis and hydroureter secondary to an obstructing calculus in the right mid ureter measuring 8 x 7 x 8mm. No other ureteral calculi seen.     IMPRESSION    There is high-grade small bowel obstruction with transition zone in the left lower quadrant without pneumatosis or portal venous gas or free air. Mild right hydronephrosis and hydroureter secondary to an obstructing calculus in the right mid ureter measuring 8 x 7 x 8 mm.     Complex left adnexal cyst and a 6 cm septated right adnexal cyst.     AUA Assessment Score:   ;    AUA Bother Rating:              Current Outpatient Medications   Medication Sig Dispense Refill    hydroCHLOROthiazide (HYDRODIURIL) 25 mg tablet Take 25 mg by mouth daily.        losartan (COZAAR) 25 mg tablet Take 1 Tab by mouth daily.        zonisamide (Zonegran) 100 mg capsule Take  by mouth daily.        glycopyrrolate (ROBINUL) 1 mg tablet Take 1 mg by mouth three (3) times daily.        ferrous sulfate 325 mg (65 mg iron) tablet Take  by mouth Daily (before breakfast).        pediatric multivitamins (ANIMAL SHAPE VITAMINS) chewable tablet Take 1 Tab by mouth daily.        OLANZapine (ZYPREXA) 10 mg tablet Take 10 mg by mouth nightly.        fluvoxaMINE (LUVOX) 25 mg tablet Take 25 mg by mouth two (2) times a day.        Omeprazole delayed release (PRILOSEC D/R) 20 mg tablet Take 20 mg by mouth daily.        polyethylene glycol (MIRALAX) 17 gram packet Take 17 g by mouth daily.        LACTULOSE PO Take  by mouth.             All:  No Known Allergies          Past Medical History:   Diagnosis Date    Anxiety      Ataxic cerebral palsy (HCC)      Autism      GERD (gastroesophageal reflux disease)      Hydrocephalus (HCC)      Hypertension      Kidney stone      OCD (obsessive compulsive disorder)      Seizures (HCC)      UTI (urinary tract infection)                 Past Surgical History:   Procedure Laterality Date    HX UROLOGICAL        [place] CYSTOSCOPY,INSERT URETERAL STENT             Review of Systems  Constitutional: Fever: No  Skin: Rash: No  HEENT: Hearing difficulty: No  Eyes: Blurred vision: No  Cardiovascular: Chest pain: No  Respiratory: Shortness of breath: No  Gastrointestinal: Nausea/vomiting: No  Musculoskeletal: Back pain: No  Neurological: Weakness: No  Psychological: Memory loss: No  Comments/additional findings:      Any elements of the PMFSHx, ROS, or preliminary elements of the HPI that were entered by a medical assistant have been reviewed in full.     PHYSICAL EXAM:    Visit Vitals  Ht 5' 1\" (1.549 m)   Wt 155 lb (70.3 kg)   BMI 29.29 kg/m²      Constitutional: WDWN, Pleasant and appropriate affect, No acute distress.     CV:  No peripheral swelling noted, RRR  Respiratory: No respiratory distress or difficulties, CTAB.  Skin: No jaundice. Neuro/Psych:  Alert and oriented x 3. Affect appropriate.         Body mass index is 29.29 kg/m². Patient's BMI is out of the normal parameters. Information about BMI was given and patient was advised to follow-up with their PCP for further management. Labs Today:        Results for orders placed or performed in visit on 01/13/21   AMB POC URINALYSIS DIP STICK AUTO W/O MICRO   Result Value Ref Range     Color (UA POC) Yellow       Clarity (UA POC) Clear       Glucose (UA POC) Negative Negative     Bilirubin (UA POC) Negative Negative     Ketones (UA POC) Negative Negative     Specific gravity (UA POC) 1.025 1.001 - 1.035     Blood (UA POC) 1+ Negative     pH (UA POC) 7.0 4.6 - 8.0     Protein (UA POC) 1+ Negative     Urobilinogen (UA POC) 0.2 mg/dL 0.2 - 1     Nitrites (UA POC) Negative Negative     Leukocyte esterase (UA POC) 3+ Negative         Marietta Sanchez M.D., F.A.C. S.     Documentation provided by Deandre Azar medical scribe for Marietta Sanchez M.D. Date of Surgery Update:  Yakov Ying was seen and examined. History and physical has been reviewed. The patient has been examined. There have been no significant clinical changes since the completion of the originally dated History and Physical.  Patient identified by surgeon; surgical site was confirmed by patient and surgeon. Patient is mentally handicapped and has been rescheduled multiple times due to UTI or inability to get IV access. With great difficulty IV access was obtained today and we will proceed with treatment.     Signed By: Isis Mcintyre MD     February 3, 2021 12:27 PM

## 2021-02-03 NOTE — PERIOP NOTES
.Report received from Sherly Solomon:    02/03/21 1255 02/03/21 1300 02/03/21 1305 02/03/21 1323   BP: 130/65 (!) 89/70 93/60 128/85   Pulse: 90 100 95 87   Resp: 15 19 17 16   Temp:   97.3 °F (36.3 °C) 97.3 °F (36.3 °C)   SpO2: 100% 100% 100% 96%   Weight:       Height:            Patient  is oriented to time, place, person and situation    Patient OOB to chair, and tolerating fluids and activity. Vital signs stable. Pain tolerable. Denies nausea. Lines and Drains  Peripheral Intravenous Line:   Peripheral IV 02/03/21 Left Foot (Active)   Site Assessment Clean, dry, & intact 02/03/21 1305   Phlebitis Assessment 0 02/03/21 1305   Infiltration Assessment 0 02/03/21 1305   Dressing Status Clean, dry, & intact 02/03/21 1305   Dressing Type Tape;Transparent 02/03/21 1305   Hub Color/Line Status Pink; Infusing 02/03/21 1305       Patient assisted to chair with minimal assistance. Discharge instructions were given to patient and discussed with the caregiver (via telephone. ..due to to emergency protocols in place). No questions or concerns at this time. Patient had time to ask any questions. Patient and caregiver verbalized understanding of discharge instructions.      Patient discharged home with caregiver      Ashley Serrano RN

## 2021-02-03 NOTE — DISCHARGE INSTRUCTIONS
Patient Education        Shock Wave Lithotripsy: What to Expect at Home  Your Recovery     Lithotripsy is a way to treat kidney stones without surgery. It is also called extracorporeal shock wave lithotripsy, or ESWL. This treatment uses sound waves to break kidney stones into tiny pieces. These pieces can then pass out of the body in the urine. You may have a small amount of blood in your urine after this treatment. Your urine may be slightly pink or reddish. The blood in the urine often goes away after 2 days. You may have a plastic tube inside one of your ureters. Ureters are the tubes that connect the kidneys to the bladder. The plastic tube is called a stent. It takes urine from your kidney to your bladder. This lets the stone pass more easily. Your doctor may remove the stent in about a week or two. This care sheet gives you a general idea about how long it will take for you to recover. But each person recovers at a different pace. Follow the steps below to feel better as quickly as possible. How can you care for yourself at home? Activity    · Rest as much as you need to after you go home.     · You may do your regular activities. But avoid hard exercise or sports for a week. Wait until there is no blood in your urine and the stent is out. Diet    · You can eat your normal diet.     · Drink plenty of fluids, enough so that your urine is light yellow or clear like water. If you have kidney, heart, or liver disease and have to limit fluids, talk with your doctor before you increase the amount of fluids you drink. Medicines    · Your doctor will tell you if and when you can restart your medicines. He or she will also give you instructions about taking any new medicines.     · If you take aspirin or some other blood thinner, ask your doctor if and when to start taking it again. Make sure that you understand exactly what your doctor wants you to do.     · Be safe with medicines.  Read and follow all instructions on the label. ? If the doctor gave you a prescription medicine for pain, take it as prescribed. ? If you are not taking a prescription pain medicine, ask your doctor if you can take acetaminophen (Tylenol). Do not take ibuprofen (Advil, Motrin) or naproxen (Aleve), or similar medicines unless your doctor tells you to. ? Do not take two or more pain medicines at the same time unless the doctor told you to. Many pain medicines have acetaminophen, which is Tylenol. Too much acetaminophen (Tylenol) can be harmful. Other instructions    · Urinate through the strainer the doctor gives you. Save any stone pieces, including those that look like sand or gravel. Take these to your doctor. This will help your doctor find the cause of your stones. Follow-up care is a key part of your treatment and safety. Be sure to make and go to all appointments, and call your doctor if you are having problems. It's also a good idea to know your test results and keep a list of the medicines you take. When should you call for help? Call 911 anytime you think you may need emergency care. For example, call if:    · You passed out (lost consciousness).     · You have chest pain, are short of breath, or cough up blood. Call your doctor now or seek immediate medical care if:    · You have pain that does not get better after you take pain medicine.     · You have new or more blood clots in your urine. (It is normal for the urine to be pink for a few days.)     · You cannot urinate.     · You have symptoms of a urinary tract infection. These may include:  ? Pain or burning when you urinate. ? A frequent need to urinate without being able to pass much urine. ? Pain in the flank, which is just below the rib cage and above the waist on either side of the back. ? Blood in the urine.   ? A fever.     · You are sick to your stomach or cannot drink fluids.     · You have signs of a blood clot in your leg (called a deep vein thrombosis), such as:  ? Pain in the calf, back of the knee, thigh, or groin. ? Redness and swelling in your leg. Watch closely for any changes in your health, and be sure to contact your doctor if you have any problems. Where can you learn more? Go to http://www.gray.com/  Enter B571 in the search box to learn more about \"Shock Wave Lithotripsy: What to Expect at Home. \"  Current as of: April 15, 2020               Content Version: 12.6  © 2006-2020 [employer]. Care instructions adapted under license by [employer] (which disclaims liability or warranty for this information). If you have questions about a medical condition or this instruction, always ask your healthcare professional. [address] any warranty or liability for your use of this information.   Patient armband removed and shredded

## 2021-02-03 NOTE — ANESTHESIA POSTPROCEDURE EVALUATION
Procedure(s):  RIGHT LITHOTRIPSY EXTRACORPOREAL SHOCKWAVE ESWL. general    Anesthesia Post Evaluation      Multimodal analgesia: multimodal analgesia used between 6 hours prior to anesthesia start to PACU discharge  Patient location during evaluation: bedside  Patient participation: complete - patient participated  Level of consciousness: awake  Pain management: adequate  Airway patency: patent  Anesthetic complications: no  Cardiovascular status: stable  Respiratory status: acceptable  Hydration status: acceptable  Post anesthesia nausea and vomiting:  controlled      INITIAL Post-op Vital signs:   Vitals Value Taken Time   BP 93/60 02/03/21 1305   Temp 36.3 °C (97.3 °F) 02/03/21 1305   Pulse 100 02/03/21 1312   Resp 10 02/03/21 1312   SpO2 100 % 02/03/21 1312   Vitals shown include unvalidated device data.

## 2021-02-03 NOTE — PERIOP NOTES
Dr. Denton Drilling in to see patient. IV attempt with ultrasound unsuccessful. Patient to be taken to OR for IV access. Unable to obtain specimen for pregnancy test.   Dr. Denton Drilling aware.

## 2021-02-03 NOTE — PERIOP NOTES
Pre-Op Summary    Pt arrived via medical transport and is oriented to time, place, person and situation. Patient with unsteady gait with wheelchair assistive devices. Visit Vitals  BP (!) 139/98 (BP 1 Location: Left upper arm, BP Patient Position: Lying right side)   Pulse 74   Temp 97.2 °F (36.2 °C)   Ht 5' 2\" (1.575 m)   Wt 72.9 kg (160 lb 12 oz)   LMP 01/25/2021   SpO2 99%   BMI 29.40 kg/m²       Patients belongings are located [facility]. Patient's point of contact is Stephen Gilberto  and their contact number is: [phone]. They will be in the waiting room. They are able to receive medication information. They will be their ride home.

## 2021-02-03 NOTE — OP NOTES
Extracorporeal Shock Wave Lithotripsy Operative Note      Preoperative Diagnosis:  9 mm x 9 mm right proximal ureteral Calculus    Postoperative Diagnosis: N20.0 Kidney Stone    Procedure: Procedure(s):  RIGHT LITHOTRIPSY EXTRACORPOREAL SHOCKWAVE ESWL    Surgeon(s): Navi Dwyer MD    Anesthesia: general anesthesia  EBL:  None  Tubes and Drains:  None  Complications:  None    I discussed with patient options of shock wave lithotripsy (ESWL) and ureteroscopy (URS), either of which would be reasonable options for the patient. Explained that ureteroscopy has higher chance of success with a single treatment relative to SWL. However, it is more invasive and it is possible that will not be able to access ureter during initial procedure. ESWL risks include anesthesia risk, bleeding/hematoma, severe infection/sepsis, injury to ureter, kidney and other organs, and potential need for repeat procedures either due to inadequate breakup of the stone or [address]. She desires planned ESWL. Description of Procedure: The patient was identified and surgical site verification was performed prior to obtaining consent. The patient was brought to the lithotripsy suite and transferred to the lithotripsy table. The stone was visualized with fluoroscopy and the patient was then sedated. The stone was treated with the [address] [facility]), and a  total of 2500 shocks at a maximum Energy Intensity of 5. The stone   showed complete fragmentation. The patient was then awakened and transferred to the recovery room. The patient will be asked to follow up with Dr Claudene Flakes in 10-14 days with a kub to determine adequacy of lithotripsy. Instructions have been given to the patient and family regarding straining urine post op to catch fragments. The patient has been advised to contact the urologist, or go to the ER if severe pain occurs. Also to seek attention if fever or significant bleeding occur.     Antibiotics with Macrobid and pain meds Norco have been prescribed.     Sofia Campos MD      2/3/2021  12:39 PM

## 2021-02-03 NOTE — PERIOP NOTES
10 mg Versed ordered by Dr. Shruti Higuera to be given PO mixed with cranberry juice. After order was pulled from xsis Dr. Shruti Higuera ordered just 6 mg Versed to be given initially. Hold 4 mg back . 6 mg administered with 24 ml  Cranberry juice.

## 2021-03-09 ENCOUNTER — VIRTUAL VISIT (OUTPATIENT)
Dept: FAMILY MEDICINE CLINIC | Age: 46
End: 2021-03-09

## 2021-03-17 ENCOUNTER — OFFICE VISIT (OUTPATIENT)
Dept: FAMILY MEDICINE CLINIC | Age: 46
End: 2021-03-17
Payer: MEDICAID

## 2021-03-17 ENCOUNTER — HOSPITAL ENCOUNTER (OUTPATIENT)
Dept: LAB | Age: 46
Discharge: HOME | End: 2021-03-17
Payer: MEDICAID

## 2021-03-17 ENCOUNTER — APPOINTMENT (OUTPATIENT)
Dept: FAMILY MEDICINE CLINIC | Age: 46
End: 2021-03-17

## 2021-03-17 VITALS
SYSTOLIC BLOOD PRESSURE: 129 MMHG | RESPIRATION RATE: 18 BRPM | OXYGEN SATURATION: 100 % | TEMPERATURE: 96.8 F | DIASTOLIC BLOOD PRESSURE: 86 MMHG | WEIGHT: 166.5 LBS | BODY MASS INDEX: 30.45 KG/M2 | HEART RATE: 96 BPM

## 2021-03-17 DIAGNOSIS — D50.9 IRON DEFICIENCY ANEMIA, UNSPECIFIED IRON DEFICIENCY ANEMIA TYPE: ICD-10-CM

## 2021-03-17 DIAGNOSIS — I10 ESSENTIAL HYPERTENSION: Primary | ICD-10-CM

## 2021-03-17 DIAGNOSIS — E55.9 VITAMIN D DEFICIENCY: ICD-10-CM

## 2021-03-17 DIAGNOSIS — F40.232 PHOBIA OF DENTAL PROCEDURE: ICD-10-CM

## 2021-03-17 DIAGNOSIS — G80.9 CEREBRAL PALSY, UNSPECIFIED TYPE (HCC): ICD-10-CM

## 2021-03-17 DIAGNOSIS — I10 ESSENTIAL HYPERTENSION: ICD-10-CM

## 2021-03-17 DIAGNOSIS — F84.0 AUTISM: ICD-10-CM

## 2021-03-17 PROBLEM — F79 INTELLECTUAL DISABILITY: Status: ACTIVE | Noted: 2021-03-17

## 2021-03-17 LAB
25(OH)D3 SERPL-MCNC: 28.4 NG/ML (ref 30–100)
ALBUMIN SERPL-MCNC: 3.8 G/DL (ref 3.4–5)
ALBUMIN/GLOB SERPL: 0.7 {RATIO} (ref 0.8–1.7)
ALP SERPL-CCNC: 126 U/L (ref 45–117)
ALT SERPL-CCNC: 22 U/L (ref 13–56)
ANION GAP SERPL CALC-SCNC: 8 MMOL/L (ref 3–18)
AST SERPL-CCNC: 10 U/L (ref 10–38)
BASOPHILS # BLD: 0 K/UL (ref 0–0.1)
BASOPHILS NFR BLD: 0 % (ref 0–2)
BILIRUB SERPL-MCNC: 0.3 MG/DL (ref 0.2–1)
BUN SERPL-MCNC: 22 MG/DL (ref 7–18)
BUN/CREAT SERPL: 16 (ref 12–20)
CALCIUM SERPL-MCNC: 10.4 MG/DL (ref 8.5–10.1)
CHLORIDE SERPL-SCNC: 116 MMOL/L (ref 100–111)
CHOLEST SERPL-MCNC: 197 MG/DL
CO2 SERPL-SCNC: 27 MMOL/L (ref 21–32)
CREAT SERPL-MCNC: 1.4 MG/DL (ref 0.6–1.3)
DIFFERENTIAL METHOD BLD: ABNORMAL
EOSINOPHIL # BLD: 0.1 K/UL (ref 0–0.4)
EOSINOPHIL NFR BLD: 2 % (ref 0–5)
ERYTHROCYTE [DISTWIDTH] IN BLOOD BY AUTOMATED COUNT: 14.6 % (ref 11.6–14.5)
GLOBULIN SER CALC-MCNC: 5.2 G/DL (ref 2–4)
GLUCOSE SERPL-MCNC: 95 MG/DL (ref 74–99)
HCT VFR BLD AUTO: 42.1 % (ref 35–45)
HDLC SERPL-MCNC: 38 MG/DL (ref 40–60)
HDLC SERPL: 5.2 {RATIO} (ref 0–5)
HGB BLD-MCNC: 13.3 G/DL (ref 12–16)
IRON SATN MFR SERPL: 19 % (ref 20–50)
IRON SERPL-MCNC: 64 UG/DL (ref 50–175)
LDLC SERPL CALC-MCNC: 93.8 MG/DL (ref 0–100)
LIPID PROFILE,FLP: ABNORMAL
LYMPHOCYTES # BLD: 4.1 K/UL (ref 0.9–3.6)
LYMPHOCYTES NFR BLD: 56 % (ref 21–52)
MCH RBC QN AUTO: 30.2 PG (ref 24–34)
MCHC RBC AUTO-ENTMCNC: 31.6 G/DL (ref 31–37)
MCV RBC AUTO: 95.5 FL (ref 74–97)
MONOCYTES # BLD: 0.6 K/UL (ref 0.05–1.2)
MONOCYTES NFR BLD: 9 % (ref 3–10)
NEUTS SEG # BLD: 2.4 K/UL (ref 1.8–8)
NEUTS SEG NFR BLD: 33 % (ref 40–73)
PLATELET # BLD AUTO: 323 K/UL (ref 135–420)
PMV BLD AUTO: 10.3 FL (ref 9.2–11.8)
POTASSIUM SERPL-SCNC: 5.5 MMOL/L (ref 3.5–5.5)
PROT SERPL-MCNC: 9 G/DL (ref 6.4–8.2)
RBC # BLD AUTO: 4.41 M/UL (ref 4.2–5.3)
SODIUM SERPL-SCNC: 151 MMOL/L (ref 136–145)
TIBC SERPL-MCNC: 339 UG/DL (ref 250–450)
TRIGL SERPL-MCNC: 326 MG/DL (ref ?–150)
VLDLC SERPL CALC-MCNC: 65.2 MG/DL
WBC # BLD AUTO: 7.1 K/UL (ref 4.6–13.2)

## 2021-03-17 PROCEDURE — 99214 OFFICE O/P EST MOD 30 MIN: CPT | Performed by: INTERNAL MEDICINE

## 2021-03-17 PROCEDURE — 80061 LIPID PANEL: CPT

## 2021-03-17 PROCEDURE — 36415 COLL VENOUS BLD VENIPUNCTURE: CPT

## 2021-03-17 PROCEDURE — 83540 ASSAY OF IRON: CPT

## 2021-03-17 PROCEDURE — 82306 VITAMIN D 25 HYDROXY: CPT

## 2021-03-17 PROCEDURE — 80053 COMPREHEN METABOLIC PANEL: CPT

## 2021-03-17 PROCEDURE — 85025 COMPLETE CBC W/AUTO DIFF WBC: CPT

## 2021-03-17 RX ORDER — DIAZEPAM 5 MG/1
5 TABLET ORAL AS NEEDED
Qty: 4 TAB | Refills: 0 | Status: SHIPPED | OUTPATIENT
Start: 2021-03-17 | End: 2021-11-17

## 2021-03-17 NOTE — PROGRESS NOTES
Dr Fili Palacios psych sees for OCD and autism. No ED or urgent care. Urology this week. Has a stent from recent urinary stone. Sees neuro once yearly.

## 2021-03-17 NOTE — PROGRESS NOTES
HISTORY OF PRESENT ILLNESS  Abraham Ying is a 54 y.o. female. HPI  In with her care giver, pt has CP/Autism, minimally verbal  HTN, stable, controlled on cozaar 25 mg daily  Iron def anemia, controlled on iron, last Hgb was 11.1 on 9-  Vit d def, not controlled, last level was 22.6 on 4-19-18, she is not on vit D  Cerebral palsy/autism, followed by Neurology and Psychiatry  Phobia of dental procedures, she is seen by her dentist every 3 months, had behavioral problems before and need to take valium one hour prior to her dentists appointments, need refill so the staff can give it to her prior to her appointments with her Dentist   Review of Systems   Unable to perform ROS: Mental acuity       Physical Exam  Vitals signs reviewed. Cardiovascular:      Rate and Rhythm: Normal rate and regular rhythm. Heart sounds: Normal heart sounds. Pulmonary:      Effort: Pulmonary effort is normal.      Breath sounds: Normal breath sounds. Abdominal:      Palpations: Abdomen is soft. Tenderness: There is no abdominal tenderness. Neurological:      Comments: CP, wheelchair bound, minimally verbal.         ASSESSMENT and PLAN  Diagnoses and all orders for this visit:    1. Essential hypertension, stable, continue Losartan  -     CBC WITH AUTOMATED DIFF; Future  -     LIPID PANEL; Future  -     METABOLIC PANEL, COMPREHENSIVE; Future    2. Iron deficiency anemia, unspecified iron deficiency anemia type, stable, continue iron daily  -     CBC WITH AUTOMATED DIFF; Future  -     METABOLIC PANEL, COMPREHENSIVE; Future  -     IRON PROFILE; Future    3. Cerebral palsy, unspecified type ([address] stable, followed by Neurology    4. Autism, followed by Psychiatry    5. Phobia of dental procedure  -     diazePAM (Valium) 5 mg tablet; Take 1 Tab by mouth as needed for Anxiety (Take 1 hour prior dental or medical treatment for poor cooperation).  Max Daily Amount: 480 mg.    6. Vitamin D deficiency, will check labs  - VITAMIN D, 25 HYDROXY; Future      Follow-up and Dispositions    · Return in about 1 year (around 3/17/2022).

## 2021-03-21 DIAGNOSIS — E55.9 VITAMIN D DEFICIENCY: Primary | ICD-10-CM

## 2021-03-21 DIAGNOSIS — R77.1 HYPERGLOBULINEMIA: ICD-10-CM

## 2021-03-21 RX ORDER — MELATONIN
1000 DAILY
Qty: 30 TAB | Refills: 11 | Status: SHIPPED | OUTPATIENT
Start: 2021-03-21 | End: 2022-02-17 | Stop reason: SDUPTHER

## 2021-03-21 NOTE — PROGRESS NOTES
Hgb is normal, continue iron  Vit D is slightly low, recommend starting Vit D3 1000 units daily OTC, Rx sent  Cholesterol is ok, but TG are high, need low carb diet  Globulin level is elevated, higher than 9-20 labs, need Hem evaluation for Hyper globulinemia, ref placed    Need to follow up and repeat labs in 4 months

## 2021-03-22 NOTE — PROGRESS NOTES
Spoke to pt's nurse Leda Coppola. She is following a 1600 low carb diet currently. They did get the vitamin D order. Scheduled 4 month follow. Will process referral to hem today. Nurse said she has been before but they are happy to arrange it for her again.

## 2021-07-21 ENCOUNTER — OFFICE VISIT (OUTPATIENT)
Dept: FAMILY MEDICINE CLINIC | Age: 46
End: 2021-07-21
Payer: MEDICAID

## 2021-07-21 VITALS
HEART RATE: 92 BPM | SYSTOLIC BLOOD PRESSURE: 110 MMHG | TEMPERATURE: 97.6 F | DIASTOLIC BLOOD PRESSURE: 76 MMHG | BODY MASS INDEX: 34.04 KG/M2 | WEIGHT: 185 LBS | OXYGEN SATURATION: 100 % | HEIGHT: 62 IN | RESPIRATION RATE: 20 BRPM

## 2021-07-21 DIAGNOSIS — Z23 ENCOUNTER FOR IMMUNIZATION: ICD-10-CM

## 2021-07-21 DIAGNOSIS — Z12.11 COLON CANCER SCREENING: ICD-10-CM

## 2021-07-21 DIAGNOSIS — D50.9 IRON DEFICIENCY ANEMIA, UNSPECIFIED IRON DEFICIENCY ANEMIA TYPE: ICD-10-CM

## 2021-07-21 DIAGNOSIS — I10 ESSENTIAL HYPERTENSION: Primary | ICD-10-CM

## 2021-07-21 DIAGNOSIS — E55.9 VITAMIN D DEFICIENCY: ICD-10-CM

## 2021-07-21 PROBLEM — R77.1 HYPERGLOBULINEMIA: Status: ACTIVE | Noted: 2021-07-21

## 2021-07-21 PROCEDURE — 99214 OFFICE O/P EST MOD 30 MIN: CPT | Performed by: INTERNAL MEDICINE

## 2021-07-21 PROCEDURE — 90715 TDAP VACCINE 7 YRS/> IM: CPT | Performed by: INTERNAL MEDICINE

## 2021-07-21 PROCEDURE — 90471 IMMUNIZATION ADMIN: CPT | Performed by: INTERNAL MEDICINE

## 2021-07-21 NOTE — PROGRESS NOTES
HISTORY OF PRESENT ILLNESS  Bob Ying is a 54 y.o. female. HPI  HTN, stable, bp is controlled on cozaar daily  Iron def anemia, stable on iron daily  Vit d def, improving on vit d daily since last visit  Pt is minimally verbal, has Autism, with her care giver today, she reports no new concerns, she is followed by Neurology and Psychiatry  Review of Systems   Unable to perform ROS: Mental acuity   Constitutional:        Pt looks comfortable, in her WC       Physical Exam  Vitals reviewed. Cardiovascular:      Rate and Rhythm: Normal rate and regular rhythm. Heart sounds: Normal heart sounds. Pulmonary:      Effort: Pulmonary effort is normal.      Breath sounds: Normal breath sounds. Abdominal:      General: Bowel sounds are normal.      Palpations: Abdomen is soft. Tenderness: There is no abdominal tenderness. Neurological:      Comments: CP, wheelchair bound, minimally verbal.         ASSESSMENT and PLAN  Diagnoses and all orders for this visit:    1. Essential hypertension, stable, continue cozaar daily  -     TSH AND FREE T4; Future  -     METABOLIC PANEL, BASIC; Future    2. Vitamin D deficiency, improving, will check labs  -     VITAMIN D, 25 HYDROXY; Future    3. Iron deficiency anemia, unspecified iron deficiency anemia type, stable, continue iron daily  -     CBC WITH AUTOMATED DIFF; Future  -     IRON; Future    4. Colon cancer screening  -     OCCULT BLOOD IMMUNOASSAY,DIAGNOSTIC; Future    5. Encounter for immunization  -     TETANUS, DIPHTHERIA TOXOIDS AND ACELLULAR PERTUSSIS VACCINE (TDAP), IN INDIVIDS. >=7, IM  -     PA IMMUNIZ ADMIN,1 SINGLE/COMB VAC/TOXOID      Follow-up and Dispositions    · Return in about 6 months (around 1/21/2022).        Lab Results   Component Value Date/Time    Vitamin D 25-Hydroxy 28.4 (L) 03/17/2021 02:40 PM       Lab Results   Component Value Date/Time    WBC 7.1 03/17/2021 02:40 PM    HGB 13.3 03/17/2021 02:40 PM    HCT 42.1 03/17/2021 02:40 PM PLATELET 985 98/38/7160 02:40 PM    MCV 95.5 03/17/2021 02:40 PM

## 2021-07-21 NOTE — PROGRESS NOTES
Racquel Bullock is a 54 y.o. female (: 1975) presenting to address:    Chief Complaint   Patient presents with    Medication Evaluation       Vitals:    21 1004 21 1041   BP: (!) 135/93 110/76   Pulse: (!) 106 92   Resp: 20    Temp: 97.6 °F (36.4 °C)    TempSrc: Temporal    SpO2: 100%    Weight: 185 lb (83.9 kg)    Height: 5' 2\" (1.575 m)        Hearing/Vision:   No exam data present    Learning Assessment:     Learning Assessment 2020   PRIMARY LEARNER Patient   BARRIERS PRIMARY LEARNER COGNITIVE   PRIMARY LANGUAGE ENGLISH   LEARNER PREFERENCE PRIMARY VIDEOS   ANSWERED BY Caregiver   RELATIONSHIP OTHER     Depression Screening:     3 most recent PHQ Screens 2021   PHQ Not Done Medical Reason (indicate in comments)   Little interest or pleasure in doing things -   Feeling down, depressed, irritable, or hopeless -   Total Score PHQ 2 -     Fall Risk Assessment:     Fall Risk Assessment, last 12 mths 3/17/2021   Able to walk? Yes   Fall in past 12 months? 0   Do you feel unsteady? 0   Are you worried about falling 0     Abuse Screening:     Abuse Screening Questionnaire 3/17/2021   Do you ever feel afraid of your partner? N   Are you in a relationship with someone who physically or mentally threatens you? N   Is it safe for you to go home? Y     Coordination of Care Questionaire:   1. Have you been to the ER, urgent care clinic since your last visit? Hospitalized since your last visit? NO    2. Have you seen or consulted any other health care providers outside of the [address] [place] [address] since your last visit? Include any pap smears or colon screening. YES    Advanced Directive:   1. Do you have an Advanced Directive? YES    2. Would you like information on Advanced Directives? NO    TDAP Immunization/s administered today by Elizabeth Hoffman CMA with patient/guardian's consent.     Verified with Dr. Beatriz Galvin - given left deltoid 0.5 ml lot # [id number]  Exp. 23  NDC: 08042-799-84  Patient tolerated procedure well. No bleeding observed at injection site. No reactions noted.

## 2021-10-12 DIAGNOSIS — E55.9 VITAMIN D DEFICIENCY: ICD-10-CM

## 2021-10-12 DIAGNOSIS — I10 ESSENTIAL HYPERTENSION: ICD-10-CM

## 2021-10-12 DIAGNOSIS — D50.9 IRON DEFICIENCY ANEMIA, UNSPECIFIED IRON DEFICIENCY ANEMIA TYPE: ICD-10-CM

## 2021-11-16 DIAGNOSIS — F40.232 PHOBIA OF DENTAL PROCEDURE: ICD-10-CM

## 2021-11-17 RX ORDER — DIAZEPAM 5 MG/1
TABLET ORAL
Qty: 4 TABLET | Refills: 0 | Status: SHIPPED | OUTPATIENT
Start: 2021-11-17 | End: 2022-09-14

## 2021-11-23 ENCOUNTER — TELEPHONE (OUTPATIENT)
Dept: FAMILY MEDICINE CLINIC | Age: 46
End: 2021-11-23

## 2021-11-23 NOTE — TELEPHONE ENCOUNTER
Called Nurse Nitza Atkins. They contacted on call to get mobile xray. Xray orders were not received so they took pt to hospital. Pt is inpatient with a femur fracture.

## 2021-11-23 NOTE — TELEPHONE ENCOUNTER
[facility] called and stated they needed referral for left and right knee as well as bilateral hip resent it was placed by blaine.    [facility] contact [phone]

## 2021-11-30 ENCOUNTER — TELEPHONE (OUTPATIENT)
Dept: FAMILY MEDICINE CLINIC | Age: 46
End: 2021-11-30

## 2021-11-30 NOTE — TELEPHONE ENCOUNTER
Caitlin from [facility] called on behalf of the pt. She stated that they needed the original order for the mobile xray from 11/21/21 that was ordered by Dionne. Caitlin was informed that there was no original order for the mobile xray and that Casise has already spoken to Cinthia from [facility] in this regard. Please contact Caitlin at your earliest convenience. Please advise

## 2021-11-30 NOTE — TELEPHONE ENCOUNTER
Georgina José from [facility] called on behalf of the pt. She stated that they needed the original order for the mobile xray from 11/21/21 that was ordered by Austin Nix. Georgina José was informed that there was no original order for the mobile xray and that Cori Kitchen has already spoken to CECY from [employer] in this regard. Please contact Georgina José at your earliest convenience.  Please advise

## 2021-12-01 NOTE — TELEPHONE ENCOUNTER
Returned call. Correct number is [phone].  Reached voicemail,left msg asking if mobile order is still needed since it wasn't performed and pt was taken to hospital.

## 2021-12-01 NOTE — TELEPHONE ENCOUNTER
Returned call. Correct number is [phone]. Reached voicemail,left msg asking if mobile order is still needed since it wasn't performed and pt was taken to hospital.

## 2022-02-10 ENCOUNTER — TELEPHONE (OUTPATIENT)
Dept: FAMILY MEDICINE CLINIC | Age: 47
End: 2022-02-10

## 2022-02-10 NOTE — TELEPHONE ENCOUNTER
----- Message from [employer] [facility] sent at 2/10/2022  8:24 AM EST -----  Subject: Hospital Follow Up    QUESTIONS  What hospital was the Patient Discharged from? [facility]  Date of Discharge? 2022-02-04  Discharge Location? [facility]  Reason for hospitalization as patient stated? Shunt revision, passed a   kidney stone, uti  What question does the patient have, if applicable? patient needs a   hospital f/u- [facility] from [place] [address]. Please call back . ---------------------------------------------------------------------------  --------------  Iveth Rooney INFO  What is the best way for the office to contact you? OK to leave message on   voicemail  Preferred Call Back Phone Number? [phone]  ---------------------------------------------------------------------------  --------------  SCRIPT ANSWERS  Relationship to Patient? Third Party  Representative Name? [facility]  (Patient requests to see provider urgently. )? No  (Has the patient been discharged from the hospital within 2 business days   AND does not have a Telephone Encounter  Follow Up From [address] [place] [address]   documented in [address]?)? Yes  Do you have any questions for your primary care provider that need to be   answered prior to your appointment? (Use RN Triage if question pertains to   anything on the red flag list)? No  (Patient needs follow up visit after hospital discharge) Book first   available appointment within 7 days OF DISCHARGE, if no appt, proceed to   book the next available time slot within 14 days OF DISCHARGE AND Send   Message to Provider. [address] [facility] Follow Up appointment cannot be booked   beyond 14 Days and should result in a Message to Provider. ?  Yes

## 2022-02-14 ENCOUNTER — HOSPITAL ENCOUNTER (OUTPATIENT)
Dept: LAB | Age: 47
Discharge: HOME | End: 2022-02-14
Payer: MEDICAID

## 2022-02-14 ENCOUNTER — APPOINTMENT (OUTPATIENT)
Dept: FAMILY MEDICINE CLINIC | Age: 47
End: 2022-02-14

## 2022-02-14 ENCOUNTER — OFFICE VISIT (OUTPATIENT)
Dept: FAMILY MEDICINE CLINIC | Age: 47
End: 2022-02-14
Payer: MEDICAID

## 2022-02-14 VITALS
SYSTOLIC BLOOD PRESSURE: 124 MMHG | BODY MASS INDEX: 33.84 KG/M2 | HEIGHT: 62 IN | HEART RATE: 94 BPM | TEMPERATURE: 97.9 F | DIASTOLIC BLOOD PRESSURE: 80 MMHG | OXYGEN SATURATION: 100 % | RESPIRATION RATE: 16 BRPM

## 2022-02-14 DIAGNOSIS — Z12.11 COLON CANCER SCREENING: ICD-10-CM

## 2022-02-14 DIAGNOSIS — E55.9 VITAMIN D DEFICIENCY: ICD-10-CM

## 2022-02-14 DIAGNOSIS — K59.09 OTHER CONSTIPATION: ICD-10-CM

## 2022-02-14 DIAGNOSIS — D50.9 IRON DEFICIENCY ANEMIA, UNSPECIFIED IRON DEFICIENCY ANEMIA TYPE: ICD-10-CM

## 2022-02-14 DIAGNOSIS — I10 ESSENTIAL HYPERTENSION: Primary | ICD-10-CM

## 2022-02-14 LAB
25(OH)D3 SERPL-MCNC: 25.9 NG/ML (ref 30–100)
BASOPHILS # BLD: 0 K/UL (ref 0–0.1)
BASOPHILS NFR BLD: 0 % (ref 0–2)
DIFFERENTIAL METHOD BLD: ABNORMAL
EOSINOPHIL # BLD: 0.2 K/UL (ref 0–0.4)
EOSINOPHIL NFR BLD: 2 % (ref 0–5)
ERYTHROCYTE [DISTWIDTH] IN BLOOD BY AUTOMATED COUNT: 15.5 % (ref 11.6–14.5)
HCT VFR BLD AUTO: 40.5 % (ref 35–45)
HGB BLD-MCNC: 11.6 G/DL (ref 12–16)
IMM GRANULOCYTES # BLD AUTO: 0 K/UL (ref 0–0.04)
IMM GRANULOCYTES NFR BLD AUTO: 0 % (ref 0–0.5)
IRON SATN MFR SERPL: 10 % (ref 20–50)
IRON SERPL-MCNC: 30 UG/DL (ref 50–175)
LYMPHOCYTES # BLD: 3.3 K/UL (ref 0.9–3.6)
LYMPHOCYTES NFR BLD: 32 % (ref 21–52)
MCH RBC QN AUTO: 28.4 PG (ref 24–34)
MCHC RBC AUTO-ENTMCNC: 28.6 G/DL (ref 31–37)
MCV RBC AUTO: 99.3 FL (ref 78–100)
MONOCYTES # BLD: 1 K/UL (ref 0.05–1.2)
MONOCYTES NFR BLD: 10 % (ref 3–10)
NEUTS SEG # BLD: 5.9 K/UL (ref 1.8–8)
NEUTS SEG NFR BLD: 56 % (ref 40–73)
NRBC # BLD: 0 K/UL (ref 0–0.01)
NRBC BLD-RTO: 0 PER 100 WBC
PLATELET # BLD AUTO: 470 K/UL (ref 135–420)
PLATELET COMMENTS,PCOM: ABNORMAL
PMV BLD AUTO: 9.3 FL (ref 9.2–11.8)
RBC # BLD AUTO: 4.08 M/UL (ref 4.2–5.3)
RBC MORPH BLD: ABNORMAL
TIBC SERPL-MCNC: 314 UG/DL (ref 250–450)
WBC # BLD AUTO: 10.4 K/UL (ref 4.6–13.2)

## 2022-02-14 PROCEDURE — 99214 OFFICE O/P EST MOD 30 MIN: CPT | Performed by: INTERNAL MEDICINE

## 2022-02-14 PROCEDURE — 82306 VITAMIN D 25 HYDROXY: CPT

## 2022-02-14 PROCEDURE — 36415 COLL VENOUS BLD VENIPUNCTURE: CPT

## 2022-02-14 PROCEDURE — 85025 COMPLETE CBC W/AUTO DIFF WBC: CPT

## 2022-02-14 PROCEDURE — 83540 ASSAY OF IRON: CPT

## 2022-02-14 NOTE — PROGRESS NOTES
Emely Beebe is a 51 y.o. female (: 1975) presenting to address:    Chief Complaint   Patient presents with    Medication Evaluation       Vitals:    22 1417   BP: (!) 167/96   Pulse: (!) 114   Resp: 16   Temp: 97.9 °F (36.6 °C)   TempSrc: Temporal   SpO2: 100%   Height: 5' 2\" (1.575 m)   PainSc:   0 - No pain       Hearing/Vision:   No exam data present    Learning Assessment:     Learning Assessment 2020   PRIMARY LEARNER Patient   BARRIERS PRIMARY LEARNER COGNITIVE   PRIMARY LANGUAGE ENGLISH   LEARNER PREFERENCE PRIMARY VIDEOS   ANSWERED BY Caregiver   RELATIONSHIP OTHER     Depression Screening:     3 most recent PHQ Screens 2022   PHQ Not Done Functional capacity motivation limits accuracy   Little interest or pleasure in doing things -   Feeling down, depressed, irritable, or hopeless -   Total Score PHQ 2 -     Fall Risk Assessment:     Fall Risk Assessment, last 12 mths 2022   Able to walk? No   Fall in past 12 months? -   Do you feel unsteady? -   Are you worried about falling -     Abuse Screening:     Abuse Screening Questionnaire 2022   Do you ever feel afraid of your partner? N   Are you in a relationship with someone who physically or mentally threatens you? N   Is it safe for you to go home? Y     ADL Assessment:   No flowsheet data found. Coordination of Care Questionaire:   1. \"Have you been to the ER, urgent care clinic since your last visit? Hospitalized since your last visit? \" Yes Where: [place] [address]    2. \"Have you seen or consulted any other health care providers outside of the [address] [place] [address] since your last visit? \" No     3. For patients aged 39-70: Has the patient had a colonoscopy? No     If the patient is female:    4. For patients aged 41-77: Has the patient had a mammogram within the past 2 years? No    5. For patients aged 21-65: Has the patient had a pap smear? Yes - no Care Gap present    Advanced Directive:   1.  Do you have an Advanced Directive? NO    2. Would you like information on Advanced Directives?  NO

## 2022-02-14 NOTE — PROGRESS NOTES
HISTORY OF PRESENT ILLNESS  Fay Ying is a 51 y.o. female. HPI  Pt is in with her care giver today. HTN, stable, bp is controlled on cozaar 25 mg daily  Vit D def, stable, last level was 55.7 in July, but her Vit D was discontinued in the hospital when she was discharged 10 days ago!, pt was in for shunt malfunction, had new ventriculoperitoneal shunt placed by Neurosurgery, discharged on 2-4-2022, her D/C summary noted today, will repeat her vit d level  Constipation, controlled on Lactulose daily  Iron def anemia, stable, on Ferrous sulfate daily  Pt has Cerebral palsy/autism/intellectual disability, minimally verbal, can not give any history. Review of Systems   Unable to perform ROS: Mental acuity   Constitutional:        Pt looks comfortable, in her WC       Physical Exam  Vitals reviewed. Cardiovascular:      Rate and Rhythm: Normal rate and regular rhythm. Heart sounds: Normal heart sounds. Pulmonary:      Effort: Pulmonary effort is normal.      Breath sounds: Normal breath sounds. Abdominal:      General: Bowel sounds are normal.      Palpations: Abdomen is soft. Tenderness: There is no abdominal tenderness. Musculoskeletal:      Right lower leg: No edema. Left lower leg: No edema. Neurological:      Comments: CP, wheelchair bound, minimally verbal, follows very simple commands only. ASSESSMENT and PLAN  Diagnoses and all orders for this visit:    1. Essential hypertension, controlled, continue cozaar daily    2. Vitamin D deficiency, controlled, will check her level today  -     VITAMIN D, 25 HYDROXY; Future    3. Iron deficiency anemia, unspecified iron deficiency anemia type, stable, continue ferrous sulfate  -     CBC WITH AUTOMATED DIFF; Future  -     IRON PROFILE; Future    4. Colon cancer screening  -     OCCULT BLOOD IMMUNOASSAY,DIAGNOSTIC; Future    5.  Other constipation, stable, continue Lactulose    Pt has refills on her meds for now      Follow-up and Dispositions    · Return in about 4 months (around 6/14/2022). Vitamin D 25 Hydroxy  Specimen:  Blood - Blood (substance)   Ref Range & Units 6 mo ago   Vitamin D, 25 Hydroxy 32.0 - 100.0 ng/mL 55.7    Resulting Agency  [facility]   Specimen Collected: 07/27/21 11:10 AM Last Resulted: 07/27/21  8:08 PM     Contains abnormal data Basic Metabolic Panel (BMP) now  Specimen:  Blood - Blood (substance)   Ref Range & Units 10 d ago Comments   Potassium 3.5 - 5.5 mmol/L 4.0     Sodium 133 - 145 mmol/L 140     Chloride 98 - 110 mmol/L 111 High      Glucose 70 - 99 mg/dL 99     Calcium 8.4 - 10.5 mg/dL 9.2     BUN 6 - 22 mg/dL 13     Creatinine 0.5 - 1.2 mg/dL 0.7     CO2 20 - 32 mmol/L 15 Low      eGFR  >60.0 >60.0     eGFR Non African American >60.0 >60.0     Anion Gap 3.0 - 15.0 mmol/L 14.0  Anion Gap calculation based on electrolyte reference ranges. Resulting Agency  [facility]      Narrative  Performed by [employer]  Estimated GFR results are reported in mL/min/1.73 sq.m. by the MDRD equation. This eGFR is validated for stable chronic renal failure patients. This equation is unreliable in acute illness or patients with normal renal function.   Specimen Collected: 02/04/22  5:39 AM Last Resulted: 02/04/22  6:41 AM

## 2022-02-17 DIAGNOSIS — E55.9 VITAMIN D DEFICIENCY: ICD-10-CM

## 2022-02-17 DIAGNOSIS — D50.9 IRON DEFICIENCY ANEMIA, UNSPECIFIED IRON DEFICIENCY ANEMIA TYPE: Primary | ICD-10-CM

## 2022-02-17 RX ORDER — LANOLIN ALCOHOL/MO/W.PET/CERES
325 CREAM (GRAM) TOPICAL 2 TIMES DAILY
Qty: 60 TABLET | Refills: 11 | Status: SHIPPED | OUTPATIENT
Start: 2022-02-17 | End: 2022-02-23 | Stop reason: SDUPTHER

## 2022-02-17 RX ORDER — MELATONIN
1000 DAILY
Qty: 30 TABLET | Refills: 11 | Status: SHIPPED | OUTPATIENT
Start: 2022-02-17 | End: 2022-02-23 | Stop reason: SDUPTHER

## 2022-02-17 NOTE — PROGRESS NOTES
Spoke with Dillon Bullock, patients care taker, voiced understanding. Also states pt requires scripts to start vitamin D and to increase her dose for ferrous sulfate bid.

## 2022-02-17 NOTE — PROGRESS NOTES
Vit D is low, please restart Vit D daily, 1000 units  Hgb is slightly low, iron level is low, please increase Ferrous sulfate dose to one on BID.

## 2022-02-23 DIAGNOSIS — D50.9 IRON DEFICIENCY ANEMIA, UNSPECIFIED IRON DEFICIENCY ANEMIA TYPE: ICD-10-CM

## 2022-02-23 DIAGNOSIS — E55.9 VITAMIN D DEFICIENCY: ICD-10-CM

## 2022-02-23 RX ORDER — MELATONIN
1000 DAILY
Qty: 30 TABLET | Refills: 11 | Status: SHIPPED | OUTPATIENT
Start: 2022-02-23

## 2022-02-23 RX ORDER — LANOLIN ALCOHOL/MO/W.PET/CERES
325 CREAM (GRAM) TOPICAL 2 TIMES DAILY
Qty: 60 TABLET | Refills: 11 | Status: SHIPPED | OUTPATIENT
Start: 2022-02-23

## 2022-03-10 LAB — HEMOCCULT STL QL IA: NEGATIVE

## 2022-03-18 PROBLEM — D50.9 IRON DEFICIENCY ANEMIA: Status: ACTIVE | Noted: 2020-01-30

## 2022-03-18 PROBLEM — N13.9 OBSTRUCTIVE UROPATHY: Status: ACTIVE | Noted: 2020-08-25

## 2022-03-18 PROBLEM — I10 ESSENTIAL HYPERTENSION: Status: ACTIVE | Noted: 2020-01-30

## 2022-03-19 PROBLEM — F40.232 PHOBIA OF DENTAL PROCEDURE: Status: ACTIVE | Noted: 2021-03-17

## 2022-03-19 PROBLEM — F79 INTELLECTUAL DISABILITY: Status: ACTIVE | Noted: 2021-03-17

## 2022-03-19 PROBLEM — F84.0 AUTISM: Status: ACTIVE | Noted: 2021-03-17

## 2022-03-19 PROBLEM — N20.1 RIGHT URETERAL CALCULUS: Status: ACTIVE | Noted: 2021-02-03

## 2022-03-19 PROBLEM — R77.1 HYPERGLOBULINEMIA: Status: ACTIVE | Noted: 2021-07-21

## 2022-03-19 PROBLEM — G80.9 CEREBRAL PALSY (HCC): Status: ACTIVE | Noted: 2020-01-30

## 2022-03-20 PROBLEM — E55.9 VITAMIN D DEFICIENCY: Status: ACTIVE | Noted: 2021-03-17

## 2022-04-19 ENCOUNTER — OFFICE VISIT (OUTPATIENT)
Dept: FAMILY MEDICINE CLINIC | Age: 47
End: 2022-04-19
Payer: MEDICAID

## 2022-04-19 VITALS
BODY MASS INDEX: 33.13 KG/M2 | RESPIRATION RATE: 16 BRPM | SYSTOLIC BLOOD PRESSURE: 121 MMHG | WEIGHT: 180 LBS | HEIGHT: 62 IN | HEART RATE: 97 BPM | TEMPERATURE: 95.5 F | OXYGEN SATURATION: 97 % | DIASTOLIC BLOOD PRESSURE: 85 MMHG

## 2022-04-19 DIAGNOSIS — L85.3 DRY SKIN: ICD-10-CM

## 2022-04-19 DIAGNOSIS — D50.9 IRON DEFICIENCY ANEMIA, UNSPECIFIED IRON DEFICIENCY ANEMIA TYPE: Primary | ICD-10-CM

## 2022-04-19 DIAGNOSIS — I10 ESSENTIAL HYPERTENSION: ICD-10-CM

## 2022-04-19 DIAGNOSIS — E55.9 VITAMIN D DEFICIENCY: ICD-10-CM

## 2022-04-19 PROCEDURE — 99214 OFFICE O/P EST MOD 30 MIN: CPT | Performed by: INTERNAL MEDICINE

## 2022-04-19 NOTE — PROGRESS NOTES
Celina Millan is a 51 y.o. female (: 1975) presenting to address:    Chief Complaint   Patient presents with    Medication Evaluation       Vitals:    22 1353   BP: 121/85   Pulse: 97   Resp: 16   Temp: (!) 95.5 °F (35.3 °C)   TempSrc: Temporal   SpO2: 97%   Weight: 180 lb (81.6 kg)   Height: 5' 2\" (1.575 m)       Hearing/Vision:      Hearing Screening    125Hz 250Hz 500Hz 1000Hz 2000Hz 3000Hz 4000Hz 6000Hz 8000Hz   Right ear:            Left ear:            Vision Screening Comments: Unable to obtain. Intellectual disability    Learning Assessment:     Learning Assessment 2020   PRIMARY LEARNER Patient   BARRIERS PRIMARY LEARNER COGNITIVE   PRIMARY LANGUAGE ENGLISH   LEARNER PREFERENCE PRIMARY VIDEOS   ANSWERED BY Caregiver   RELATIONSHIP OTHER     Depression Screening:     3 most recent PHQ Screens 2022   PHQ Not Done Functional capacity motivation limits accuracy   Little interest or pleasure in doing things -   Feeling down, depressed, irritable, or hopeless -   Total Score PHQ 2 -     Fall Risk Assessment:     Fall Risk Assessment, last 12 mths 2022   Able to walk? No   Fall in past 12 months? -   Do you feel unsteady? -   Are you worried about falling -     Abuse Screening:     Abuse Screening Questionnaire 2022   Do you ever feel afraid of your partner? N   Are you in a relationship with someone who physically or mentally threatens you? N   Is it safe for you to go home?  Y     ADL Assessment:     ADL Assessment 2022   Feeding yourself No Help Needed   Getting from bed to chair Help Needed   Getting dressed Help Needed   Bathing or showering Help Needed   Walk across the room (includes cane/walker) Help Needed   Using the telphone Help Needed   Taking your medications Help Needed   Preparing meals Help Needed   Managing money (expenses/bills) Help Needed   Moderately strenuous housework (laundry) Help Needed   Shopping for personal items (toiletries/medicines) Help Needed   Shopping for groceries Help Needed   Driving Help Needed   Climbing a flight of stairs Help Needed   Getting to places beyond walking distances Help Needed        Coordination of Care Questionaire:   1. \"Have you been to the ER, urgent care clinic since your last visit? Hospitalized since your last visit? \" No    2. \"Have you seen or consulted any other health care providers outside of the [address] [place] [address] [place] since your last visit? \" No     3. For patients aged 39-70: Has the patient had a colonoscopy? Yes - no Care Gap present     If the patient is female:    4. For patients aged 41-77: Has the patient had a mammogram within the past 2 years? No    5. For patients aged 21-65: Has the patient had a pap smear? NA - based on age    Advanced Directive:   1. Do you have an Advanced Directive? no    2. Would you like information on Advanced Directives?  NO

## 2022-04-19 NOTE — PROGRESS NOTES
HISTORY OF PRESENT ILLNESS  Javan Ying is a 51 y.o. female. HPI  Iron def anemia, improving, we increased her iron dose to one tablet BID, last Hgb was 11.6  Vit D def, improving, on vit D daily, 1000 units  HTN, stable, bp is well controlled on cozaar 25 mg daily  Has few dry skin areas on her face/neck  Review of Systems   Unable to perform ROS: Mental acuity   Constitutional:        Pt looks comfortable, in her WC       Physical Exam  Vitals reviewed. Cardiovascular:      Rate and Rhythm: Normal rate and regular rhythm. Heart sounds: Normal heart sounds. No murmur heard. Pulmonary:      Effort: Pulmonary effort is normal.      Breath sounds: Normal breath sounds. Musculoskeletal:      Right lower leg: No edema. Left lower leg: No edema. Skin:     Findings: No erythema or rash. Comments: Few dry areas on the face under the mask and behind her ears. Neurological:      Comments: CP, wheelchair bound, minimally verbal, follows very simple commands only. ASSESSMENT and PLAN  Diagnoses and all orders for this visit:    1. Iron deficiency anemia, unspecified iron deficiency anemia type, improving, continue ferrous sulfate BID  -     CBC W/O DIFF; Future  -     IRON; Future    2. Vitamin D deficiency, improving, continue vit d daily    3. Essential hypertension, controlled, continue cozaar 25 mg daily    4. Dry skin  -     white petrolatum-mineral oiL (EUCERIN) topical cream; Apply  to affected area three (3) times daily as needed for Dry Skin. Follow-up and Dispositions    · Return in about 2 months (around 6/19/2022).        Lab Results   Component Value Date/Time    WBC 10.4 02/14/2022 02:53 PM    HGB 11.6 (L) 02/14/2022 02:53 PM    HCT 40.5 02/14/2022 02:53 PM    PLATELET 103 (H) 22/43/0037 02:53 PM    MCV 99.3 02/14/2022 02:53 PM

## 2022-05-04 ENCOUNTER — APPOINTMENT (OUTPATIENT)
Dept: FAMILY MEDICINE CLINIC | Age: 47
End: 2022-05-04

## 2022-05-04 DIAGNOSIS — D50.9 IRON DEFICIENCY ANEMIA, UNSPECIFIED IRON DEFICIENCY ANEMIA TYPE: ICD-10-CM

## 2022-05-05 LAB
ERYTHROCYTE [DISTWIDTH] IN BLOOD BY AUTOMATED COUNT: 16 % (ref 10–15.5)
FE % SATURATION,PSAT: 16 % (ref 20–50)
HCT VFR BLD AUTO: 47.8 % (ref 35.1–48)
HGB BLD-MCNC: 13.5 G/DL (ref 11.7–16)
IRON,IRN: 54 MCG/DL (ref 30–160)
MCH RBC QN AUTO: 29 PG (ref 26–34)
MCHC RBC AUTO-ENTMCNC: 28 G/DL (ref 31–36)
MCV RBC AUTO: 101 FL (ref 80–99)
PLATELET # BLD AUTO: 329 K/UL (ref 140–440)
PMV BLD AUTO: 10.8 FL (ref 9–13)
RBC # BLD AUTO: 4.74 M/UL (ref 3.8–5.2)
TIBC,TIBC: 331 MCG/DL (ref 228–428)
UIBC SERPL-MCNC: 277 MCG/DL (ref 110–370)
WBC # BLD AUTO: 8.9 K/UL (ref 4–11)

## 2022-05-09 NOTE — PROGRESS NOTES
Called and informed nurse at [facility] they request a copy of results be mail, letter printed and mailed.

## 2022-06-14 ENCOUNTER — OFFICE VISIT (OUTPATIENT)
Dept: FAMILY MEDICINE CLINIC | Age: 47
End: 2022-06-14
Payer: MEDICAID

## 2022-06-14 VITALS
TEMPERATURE: 97.6 F | HEART RATE: 95 BPM | OXYGEN SATURATION: 99 % | DIASTOLIC BLOOD PRESSURE: 80 MMHG | BODY MASS INDEX: 32.92 KG/M2 | SYSTOLIC BLOOD PRESSURE: 120 MMHG | HEIGHT: 62 IN | RESPIRATION RATE: 18 BRPM

## 2022-06-14 DIAGNOSIS — I10 ESSENTIAL HYPERTENSION: ICD-10-CM

## 2022-06-14 DIAGNOSIS — Z87.19 HX SBO: Primary | ICD-10-CM

## 2022-06-14 PROCEDURE — 99213 OFFICE O/P EST LOW 20 MIN: CPT | Performed by: INTERNAL MEDICINE

## 2022-06-14 NOTE — PROGRESS NOTES
HISTORY OF PRESENT ILLNESS  Louie Ying is a 51 y.o. female. HPI  Pt is in with care giver today, minimally verbal, she was in the hospital from 6-8 until 6-12-22 for SBO, had NG tube placed, she improved was was discharged without any surgery, she is having daily BM, her hospital D/C summary noted today  HTN, stable, bp is well controlled on cozaar 25 mg daily  Review of Systems   Unable to perform ROS: Mental acuity   Constitutional:        Pt looks comfortable, in her WC       Physical Exam  Vitals reviewed. Cardiovascular:      Rate and Rhythm: Normal rate and regular rhythm. Heart sounds: Normal heart sounds. Pulmonary:      Effort: Pulmonary effort is normal.      Breath sounds: Normal breath sounds. Abdominal:      General: Bowel sounds are normal.      Palpations: Abdomen is soft. Tenderness: There is no abdominal tenderness. Musculoskeletal:      Right lower leg: No edema. Left lower leg: No edema. Neurological:      Comments: CP, wheelchair bound, minimally verbal, follows very simple commands only. ASSESSMENT and PLAN  Diagnoses and all orders for this visit:    1. Hx SBO, resolved    2. Essential hypertension, controlled, continue cozaar daily      Follow-up and Dispositions    · Return in about 4 months (around 10/14/2022).

## 2022-06-14 NOTE — PROGRESS NOTES
Irvin Dela Cruz is a 51 y.o. female (: 1975) presenting to address:    Chief Complaint   Patient presents with    Medication Evaluation       Vitals:    22 1302   Pulse: 95   Resp: 18   Temp: 97.6 °F (36.4 °C)   TempSrc: Temporal   SpO2: 99%   Height: 5' 2\" (1.575 m)   PainSc:   0 - No pain       Hearing/Vision:   No exam data present    Learning Assessment:     Learning Assessment 2020   PRIMARY LEARNER Patient   BARRIERS PRIMARY LEARNER COGNITIVE   PRIMARY LANGUAGE ENGLISH   LEARNER PREFERENCE PRIMARY VIDEOS   ANSWERED BY Caregiver   RELATIONSHIP OTHER     Depression Screening:     3 most recent PHQ Screens 2022   PHQ Not Done Functional capacity motivation limits accuracy   Little interest or pleasure in doing things -   Feeling down, depressed, irritable, or hopeless -   Total Score PHQ 2 -     Fall Risk Assessment:     Fall Risk Assessment, last 12 mths 2022   Able to walk? No   Fall in past 12 months? 0   Do you feel unsteady? 0   Are you worried about falling 0     Abuse Screening:     Abuse Screening Questionnaire 2022   Do you ever feel afraid of your partner? N   Are you in a relationship with someone who physically or mentally threatens you? N   Is it safe for you to go home?  Y     ADL Assessment:     ADL Assessment 2022   Feeding yourself No Help Needed   Getting from bed to chair Help Needed   Getting dressed Help Needed   Bathing or showering Help Needed   Walk across the room (includes cane/walker) Help Needed   Using the telphone Help Needed   Taking your medications Help Needed   Preparing meals Help Needed   Managing money (expenses/bills) Help Needed   Moderately strenuous housework (laundry) Help Needed   Shopping for personal items (toiletries/medicines) Help Needed   Shopping for groceries Help Needed   Driving Help Needed   Climbing a flight of stairs Help Needed   Getting to places beyond walking distances Help Needed        Coordination of Care Questionaire:   1. \"Have you been to the ER, urgent care clinic since your last visit? Hospitalized since your last visit? \" Yes Where: [facility]    2. \"Have you seen or consulted any other health care providers outside of the [address] [place] [address] since your last visit? \" No     3. For patients aged 39-70: Has the patient had a colonoscopy? Yes - no Care Gap present     If the patient is female:    4. For patients aged 41-77: Has the patient had a mammogram within the past 2 years? No    5. For patients aged 21-65: Has the patient had a pap smear? No    Advanced Directive:   1. Do you have an Advanced Directive? NO    2. Would you like information on Advanced Directives?  NO

## 2022-11-11 ENCOUNTER — OFFICE VISIT (OUTPATIENT)
Dept: FAMILY MEDICINE CLINIC | Age: 47
End: 2022-11-11
Payer: MEDICAID

## 2022-11-11 ENCOUNTER — APPOINTMENT (OUTPATIENT)
Dept: FAMILY MEDICINE CLINIC | Age: 47
End: 2022-11-11

## 2022-11-11 VITALS
DIASTOLIC BLOOD PRESSURE: 84 MMHG | TEMPERATURE: 97.3 F | BODY MASS INDEX: 32.92 KG/M2 | SYSTOLIC BLOOD PRESSURE: 134 MMHG | HEART RATE: 90 BPM | OXYGEN SATURATION: 100 % | RESPIRATION RATE: 16 BRPM | HEIGHT: 62 IN

## 2022-11-11 DIAGNOSIS — D50.9 IRON DEFICIENCY ANEMIA, UNSPECIFIED IRON DEFICIENCY ANEMIA TYPE: ICD-10-CM

## 2022-11-11 DIAGNOSIS — K59.09 OTHER CONSTIPATION: ICD-10-CM

## 2022-11-11 DIAGNOSIS — E55.9 VITAMIN D DEFICIENCY: ICD-10-CM

## 2022-11-11 DIAGNOSIS — I10 ESSENTIAL HYPERTENSION: Primary | ICD-10-CM

## 2022-11-11 DIAGNOSIS — I10 ESSENTIAL HYPERTENSION: ICD-10-CM

## 2022-11-11 PROCEDURE — 3074F SYST BP LT 130 MM HG: CPT | Performed by: INTERNAL MEDICINE

## 2022-11-11 PROCEDURE — 99214 OFFICE O/P EST MOD 30 MIN: CPT | Performed by: INTERNAL MEDICINE

## 2022-11-11 PROCEDURE — 3078F DIAST BP <80 MM HG: CPT | Performed by: INTERNAL MEDICINE

## 2022-11-11 NOTE — PROGRESS NOTES
HISTORY OF PRESENT ILLNESS  Emely Beebe is a 49 y.o. female. HPI  HN, stable, bp is controlled on cozaar 25 mg daily  Iron def anemia, controlled on Ferrous sulfate 325 mg daily  Vit d def, improving on vit d 1000 units daily  Constipation, controlled on Miralax and Lactulose daily  Pt is minimally verbal due to her cerebral palsy and profound ID, in with her care giver today  Review of Systems   Unable to perform ROS: Mental acuity   Constitutional:         Pt looks comfortable, in her WC     Physical Exam  Vitals reviewed. Cardiovascular:      Rate and Rhythm: Normal rate and regular rhythm. Heart sounds: Normal heart sounds. Pulmonary:      Effort: Pulmonary effort is normal.      Breath sounds: Normal breath sounds. Abdominal:      General: Bowel sounds are normal.      Palpations: Abdomen is soft. Tenderness: There is no abdominal tenderness. Musculoskeletal:      Right lower leg: No edema. Left lower leg: No edema. Neurological:      Comments: Pt has Cerebral palsy, wheelchair bound, minimally verbal.       ASSESSMENT and PLAN  Diagnoses and all orders for this visit:    1. Essential hypertension, controlled, continue cozaar @ current dose  -     METABOLIC PANEL, BASIC; Future    2. Iron deficiency anemia, unspecified iron deficiency anemia type, stable, continue iron daily  -     CBC WITH AUTOMATED DIFF; Future  -     IRON; Future    3. Vitamin D deficiency, improving, continue vit D daily  -     VITAMIN D, 25 HYDROXY; Future    4.  Other constipation, stable,continue Miralax and Lactulose @ current dose    Lab Results   Component Value Date/Time    WBC 8.9 05/04/2022 12:48 PM    HGB 13.5 05/04/2022 12:48 PM    HCT 47.8 05/04/2022 12:48 PM    PLATELET 169 13/30/7541 12:48 PM     (H) 05/04/2022 12:48 PM      Lab Results   Component Value Date/Time    Vitamin D 25-Hydroxy 25.9 (L) 02/14/2022 02:53 PM         Follow-up and Dispositions    Return in about 6 months (around 5/11/2023).

## 2022-11-11 NOTE — PROGRESS NOTES
Florencio Lyon is a 49 y.o. female (: 1975) presenting to address:    Chief Complaint   Patient presents with    Medication Evaluation     Follow up       Vitals:    22 1118   BP: 138/84   Pulse: 90   Resp: 16   Temp: 97.3 °F (36.3 °C)   TempSrc: Temporal   SpO2: 100%   Height: 5' 2\" (1.575 m)       Hearing/Vision:   No results found. Learning Assessment:     Learning Assessment 2020   PRIMARY LEARNER Patient   BARRIERS PRIMARY LEARNER COGNITIVE   PRIMARY LANGUAGE ENGLISH   LEARNER PREFERENCE PRIMARY VIDEOS   ANSWERED BY Caregiver   RELATIONSHIP OTHER     Depression Screening:     3 most recent PHQ Screens 2022   PHQ Not Done Functional capacity motivation limits accuracy   Little interest or pleasure in doing things -   Feeling down, depressed, irritable, or hopeless -   Total Score PHQ 2 -     Fall Risk Assessment:     Fall Risk Assessment, last 12 mths 2022   Able to walk? No   Fall in past 12 months? 0   Do you feel unsteady? 0   Are you worried about falling 0     Abuse Screening:     Abuse Screening Questionnaire 2022   Do you ever feel afraid of your partner? N   Are you in a relationship with someone who physically or mentally threatens you? N   Is it safe for you to go home?  Y     ADL Assessment:     ADL Assessment 2022   Feeding yourself No Help Needed   Getting from bed to chair Help Needed   Getting dressed Help Needed   Bathing or showering Help Needed   Walk across the room (includes cane/walker) Help Needed   Using the telphone Help Needed   Taking your medications Help Needed   Preparing meals Help Needed   Managing money (expenses/bills) Help Needed   Moderately strenuous housework (laundry) Help Needed   Shopping for personal items (toiletries/medicines) Help Needed   Shopping for groceries Help Needed   Driving Help Needed   Climbing a flight of stairs Help Needed   Getting to places beyond walking distances Help Needed        Coordination of Care Questionaire:   1. \"Have you been to the ER, urgent care clinic since your last visit? Hospitalized since your last visit? \" No    2. \"Have you seen or consulted any other health care providers outside of the [address] [place] [address] since your last visit? \" No     3. For patients aged 39-70: Has the patient had a colonoscopy? No     If the patient is female:    4. For patients aged 41-77: Has the patient had a mammogram within the past 2 years? No    5. For patients aged 21-65: Has the patient had a pap smear? No    Advanced Directive:   1. Do you have an Advanced Directive? NO    2. Would you like information on Advanced Directives?  NO

## 2022-11-12 LAB
25(OH)D3 SERPL-MCNC: 61.3 NG/ML (ref 32–100)
ABSOLUTE LYMPHOCYTE COUNT, 10803: 4.7 K/UL (ref 1–4.8)
ANION GAP SERPL CALC-SCNC: 14 MMOL/L (ref 3–15)
BASOPHILS # BLD: 0 K/UL (ref 0–0.2)
BASOPHILS NFR BLD: 0 % (ref 0–2)
BUN SERPL-MCNC: 10 MG/DL (ref 6–22)
CALCIUM SERPL-MCNC: 10.3 MG/DL (ref 8.4–10.5)
CHLORIDE SERPL-SCNC: 108 MMOL/L (ref 98–110)
CO2 SERPL-SCNC: 24 MMOL/L (ref 20–32)
CREAT SERPL-MCNC: 0.7 MG/DL (ref 0.5–1.2)
EOSINOPHIL # BLD: 0.1 K/UL (ref 0–0.5)
EOSINOPHIL NFR BLD: 1 % (ref 0–6)
ERYTHROCYTE [DISTWIDTH] IN BLOOD BY AUTOMATED COUNT: 13.4 % (ref 10–15.5)
GLOMERULAR FILTRATION RATE: >60 ML/MIN/1.73 SQ.M.
GLUCOSE SERPL-MCNC: 66 MG/DL (ref 70–99)
GRANULOCYTES,GRANS: 29 % (ref 40–75)
HCT VFR BLD AUTO: 47 % (ref 35.1–48)
HGB BLD-MCNC: 14.5 G/DL (ref 11.7–16)
IRON,IRN: 63 MCG/DL (ref 30–160)
LYMPHOCYTES, LYMLT: 58 % (ref 20–45)
MCH RBC QN AUTO: 31 PG (ref 26–34)
MCHC RBC AUTO-ENTMCNC: 31 G/DL (ref 31–36)
MCV RBC AUTO: 99 FL (ref 80–99)
MONOCYTES # BLD: 0.8 K/UL (ref 0.1–1)
MONOCYTES NFR BLD: 10 % (ref 3–12)
NEUTROPHILS # BLD AUTO: 2.4 K/UL (ref 1.8–7.7)
PLATELET # BLD AUTO: 283 K/UL (ref 140–440)
PMV BLD AUTO: 10.9 FL (ref 9–13)
POTASSIUM SERPL-SCNC: 4.6 MMOL/L (ref 3.5–5.5)
RBC # BLD AUTO: 4.73 M/UL (ref 3.8–5.2)
SODIUM SERPL-SCNC: 146 MMOL/L (ref 133–145)
WBC # BLD AUTO: 8 K/UL (ref 4–11)

## 2023-02-17 ENCOUNTER — TELEPHONE (OUTPATIENT)
Dept: FAMILY MEDICINE CLINIC | Facility: CLINIC | Age: 48
End: 2023-02-17

## 2023-03-27 ENCOUNTER — TELEPHONE (OUTPATIENT)
Dept: FAMILY MEDICINE CLINIC | Facility: CLINIC | Age: 48
End: 2023-03-27

## 2023-05-02 ENCOUNTER — OFFICE VISIT (OUTPATIENT)
Dept: FAMILY MEDICINE CLINIC | Facility: CLINIC | Age: 48
End: 2023-05-02
Payer: MEDICAID

## 2023-05-02 VITALS
HEIGHT: 62 IN | HEART RATE: 94 BPM | WEIGHT: 189.8 LBS | OXYGEN SATURATION: 94 % | BODY MASS INDEX: 34.93 KG/M2 | SYSTOLIC BLOOD PRESSURE: 110 MMHG | TEMPERATURE: 97.9 F | DIASTOLIC BLOOD PRESSURE: 80 MMHG | RESPIRATION RATE: 17 BRPM

## 2023-05-02 DIAGNOSIS — D50.9 IRON DEFICIENCY ANEMIA, UNSPECIFIED IRON DEFICIENCY ANEMIA TYPE: ICD-10-CM

## 2023-05-02 DIAGNOSIS — K59.09 OTHER CONSTIPATION: ICD-10-CM

## 2023-05-02 DIAGNOSIS — I10 ESSENTIAL (PRIMARY) HYPERTENSION: Primary | ICD-10-CM

## 2023-05-02 DIAGNOSIS — E55.9 VITAMIN D DEFICIENCY, UNSPECIFIED: ICD-10-CM

## 2023-05-02 DIAGNOSIS — R73.09 ELEVATED GLUCOSE LEVEL: ICD-10-CM

## 2023-05-02 DIAGNOSIS — Z12.11 COLON CANCER SCREENING: ICD-10-CM

## 2023-05-02 PROCEDURE — 3074F SYST BP LT 130 MM HG: CPT | Performed by: INTERNAL MEDICINE

## 2023-05-02 PROCEDURE — 99214 OFFICE O/P EST MOD 30 MIN: CPT | Performed by: INTERNAL MEDICINE

## 2023-05-02 PROCEDURE — 3079F DIAST BP 80-89 MM HG: CPT | Performed by: INTERNAL MEDICINE

## 2023-05-02 SDOH — ECONOMIC STABILITY: INCOME INSECURITY: HOW HARD IS IT FOR YOU TO PAY FOR THE VERY BASICS LIKE FOOD, HOUSING, MEDICAL CARE, AND HEATING?: NOT HARD AT ALL

## 2023-05-02 SDOH — ECONOMIC STABILITY: FOOD INSECURITY: WITHIN THE PAST 12 MONTHS, THE FOOD YOU BOUGHT JUST DIDN'T LAST AND YOU DIDN'T HAVE MONEY TO GET MORE.: NEVER TRUE

## 2023-05-02 SDOH — ECONOMIC STABILITY: HOUSING INSECURITY
IN THE LAST 12 MONTHS, WAS THERE A TIME WHEN YOU DID NOT HAVE A STEADY PLACE TO SLEEP OR SLEPT IN A SHELTER (INCLUDING NOW)?: NO

## 2023-05-02 SDOH — ECONOMIC STABILITY: FOOD INSECURITY: WITHIN THE PAST 12 MONTHS, YOU WORRIED THAT YOUR FOOD WOULD RUN OUT BEFORE YOU GOT MONEY TO BUY MORE.: NEVER TRUE

## 2023-05-02 ASSESSMENT — PATIENT HEALTH QUESTIONNAIRE - PHQ9: DEPRESSION UNABLE TO ASSESS: FUNCTIONAL CAPACITY MOTIVATION LIMITS ACCURACY

## 2023-05-02 NOTE — PROGRESS NOTES
Brittni Santana is a 49 y.o. female (: 1975) presenting to address:    Chief Complaint   Patient presents with    Medication Check       There were no vitals filed for this visit. Coordination of Care Questionaire:   1. \"Have you been to the ER, urgent care clinic since your last visit? Hospitalized since your last visit? \" No    2. \"Have you seen or consulted any other health care providers outside of the [address] [place] [address] since your last visit? \" No     3. For patients aged 39-70: Has the patient had a colonoscopy / FIT/ Cologuard? No      If the patient is female:    4. For patients aged 41-77: Has the patient had a mammogram within the past 2 years? No      5. For patients aged 21-65: Has the patient had a pap smear? No    Advanced Directive:   1. Do you have an Advanced Directive? No    2. Would you like information on Advanced Directives?  No
HGB 11.7 - 16.0 g/dL 14.2    HCT 35.1 - 48.0 % 44.5    MCV 80 - 99 fL 101 High     MCH 26 - 34 pg 32    MCHC 31 - 36 g/dL 32    RDW 10.0 - 15.5 % 13.3    Platelet 493 - 243 K/uL 243    MPV 9.0 - 13.0 fL 10.4    Resulting [facility] LABORATORY   Specimen Collected: 04/19/23 10:08 Last Resulted: 04/19/23 12:59     Contains abnormal data Basic Metabolic Panel  Order: [id number]   Ref Range & Units 4/25/23 0948   Potassium 3.5 - 5.5 mmol/L 4.4    Sodium 133 - 145 mmol/L 142    Chloride 98 - 110 mmol/L 105    Glucose 70 - 99 mg/dL 170 High     Calcium 8.4 - 10.5 mg/dL 10.0    BUN 6 - 22 mg/dL 13    Creatinine 0.5 - 1.2 mg/dL 0.9    CO2 20 - 32 mmol/L 25    eGFR >60.0 mL/min/1.73 sq.m. >60.0    Comment: eGFR calculation based on the Chronic Kidney Disease Epidemiology Collaboration (CKD-EPI) equation refit without adjustment for race. This eGFR is validated for stable chronic renal failure patients. This equation is unreliable in acute illness or patients with normal renal function. Anion Gap 3.0 - 15.0 mmol/L 12.0    Comment: Anion Gap calculation based on electrolyte reference ranges. Resulting [facility] LABOR[facility]   Specimen Collected: 04/25/23 09:48 Last Resulted: 04/25/23 12:45     Basic Metabolic Panel  Order: [id number]   Ref Range & Units 1/4/23 0948   Potassium 3.5 - 5.5 mmol/L 4.3    Sodium 133 - 145 mmol/L 146 High     Chloride 98 - 110 mmol/L 108    Glucose 70 - 99 mg/dL 146 High     Calcium 8.4 - 10.5 mg/dL 9.5    BUN 6 - 22 mg/dL 18    Creatinine 0.5 - 1.2 mg/dL 1.0    CO2 20 - 32 mmol/L 25    eGFR >60.0 mL/min/1.73 sq.m. >60.0    Comment: eGFR calculation based on the Chronic Kidney Disease Epidemiology Collaboration (CKD-EPI) equation refit without adjustment for race. This eGFR is validated for stable chronic renal failure patients. This equation is unreliable in acute illness or patients with normal renal function.    Anion Gap 3.0

## 2023-05-03 LAB
AVERAGE GLUCOSE: 128 MG/DL (ref 91–123)
HBA1C MFR BLD: 6.1 % (ref 4.8–5.6)
VITAMIN D 25-HYDROXY: 69.7 NG/ML (ref 32–100)

## 2023-10-11 ENCOUNTER — TELEPHONE (OUTPATIENT)
Dept: FAMILY MEDICINE CLINIC | Facility: CLINIC | Age: 48
End: 2023-10-11

## 2023-10-11 RX ORDER — ACETAMINOPHEN AND CHLORPHENIRAMINE MALEATE 325; 2 MG/1; MG/1
2 TABLET, FILM COATED ORAL
Qty: 30 TABLET | Refills: 1 | Status: SHIPPED | OUTPATIENT
Start: 2023-10-11

## 2023-10-11 NOTE — TELEPHONE ENCOUNTER
Alert Team:    No risk on [place] scale. Telesitter has been ordered for safety/elopement risk. Updated RN   Received fax from Luz Chance stating pt has been having sx of nasal congestion, dry cough over the last 2 days. \"We are pushing fluids and she still has a good appetite. She is afebrile\" Luz Chance is requesting fos rx of Coricidin HBP cold and sinus \"2 tabs q6h prn for nasal congestion, do not exceed 10 tabs in 24 hours. \" Luz Chance states she knows this is OTC but the rx label must have these directions. Please advise, thank you.     [address]

## 2023-11-01 ENCOUNTER — OFFICE VISIT (OUTPATIENT)
Dept: FAMILY MEDICINE CLINIC | Facility: CLINIC | Age: 48
End: 2023-11-01
Payer: MEDICAID

## 2023-11-01 VITALS
OXYGEN SATURATION: 96 % | HEIGHT: 62 IN | WEIGHT: 191 LBS | BODY MASS INDEX: 35.15 KG/M2 | SYSTOLIC BLOOD PRESSURE: 130 MMHG | HEART RATE: 75 BPM | RESPIRATION RATE: 18 BRPM | TEMPERATURE: 97.3 F | DIASTOLIC BLOOD PRESSURE: 82 MMHG

## 2023-11-01 DIAGNOSIS — K59.09 OTHER CONSTIPATION: ICD-10-CM

## 2023-11-01 DIAGNOSIS — D50.9 IRON DEFICIENCY ANEMIA, UNSPECIFIED IRON DEFICIENCY ANEMIA TYPE: ICD-10-CM

## 2023-11-01 DIAGNOSIS — R73.03 PREDIABETES: ICD-10-CM

## 2023-11-01 DIAGNOSIS — Z23 NEED FOR VACCINATION: ICD-10-CM

## 2023-11-01 DIAGNOSIS — I10 ESSENTIAL (PRIMARY) HYPERTENSION: Primary | ICD-10-CM

## 2023-11-01 DIAGNOSIS — Z12.11 COLON CANCER SCREENING: ICD-10-CM

## 2023-11-01 DIAGNOSIS — I10 ESSENTIAL (PRIMARY) HYPERTENSION: ICD-10-CM

## 2023-11-01 PROCEDURE — 3075F SYST BP GE 130 - 139MM HG: CPT | Performed by: INTERNAL MEDICINE

## 2023-11-01 PROCEDURE — 90471 IMMUNIZATION ADMIN: CPT | Performed by: INTERNAL MEDICINE

## 2023-11-01 PROCEDURE — 90674 CCIIV4 VAC NO PRSV 0.5 ML IM: CPT | Performed by: INTERNAL MEDICINE

## 2023-11-01 PROCEDURE — 3079F DIAST BP 80-89 MM HG: CPT | Performed by: INTERNAL MEDICINE

## 2023-11-01 PROCEDURE — 99214 OFFICE O/P EST MOD 30 MIN: CPT | Performed by: INTERNAL MEDICINE

## 2023-11-01 ASSESSMENT — PATIENT HEALTH QUESTIONNAIRE - PHQ9
SUM OF ALL RESPONSES TO PHQ9 QUESTIONS 1 & 2: 0
SUM OF ALL RESPONSES TO PHQ QUESTIONS 1-9: 0
1. LITTLE INTEREST OR PLEASURE IN DOING THINGS: 0
2. FEELING DOWN, DEPRESSED OR HOPELESS: 0
SUM OF ALL RESPONSES TO PHQ QUESTIONS 1-9: 0

## 2023-11-01 ASSESSMENT — ANXIETY QUESTIONNAIRES
GAD7 TOTAL SCORE: 0
7. FEELING AFRAID AS IF SOMETHING AWFUL MIGHT HAPPEN: 0
4. TROUBLE RELAXING: 0
1. FEELING NERVOUS, ANXIOUS, OR ON EDGE: 0
3. WORRYING TOO MUCH ABOUT DIFFERENT THINGS: 0
5. BEING SO RESTLESS THAT IT IS HARD TO SIT STILL: 0
IF YOU CHECKED OFF ANY PROBLEMS ON THIS QUESTIONNAIRE, HOW DIFFICULT HAVE THESE PROBLEMS MADE IT FOR YOU TO DO YOUR WORK, TAKE CARE OF THINGS AT HOME, OR GET ALONG WITH OTHER PEOPLE: NOT DIFFICULT AT ALL
2. NOT BEING ABLE TO STOP OR CONTROL WORRYING: 0
6. BECOMING EASILY ANNOYED OR IRRITABLE: 0

## 2023-11-01 NOTE — PROGRESS NOTES
HISTORY OF PRESENT ILLNESS  Meron Sullivan is a 51 y.o. female    HPI    Pt is in with her care giver today, had C/P and ID, not verbal.  HTN, stable, on cozaar 25 mg daily  Prediabetes, stable, on low calorie diet, last A1c was 6.1  Iron def anemia, stable, on ferrous sulfate every other day  Constipation, controlled on lactulose and miralax daily  Review of Systems   Unable to perform ROS: Patient nonverbal         Physical Exam  Vitals reviewed. Cardiovascular:      Rate and Rhythm: Normal rate and regular rhythm. Heart sounds: No murmur heard. Pulmonary:      Effort: Pulmonary effort is normal.      Breath sounds: Normal breath sounds. No wheezing or rales. Abdominal:      Palpations: Abdomen is soft. Tenderness: There is no abdominal tenderness. Musculoskeletal:      Right lower leg: No edema. Left lower leg: No edema. Neurological:      Comments: Pt is WC bound, looks comfortable, able to move her arms slightly, not able to move her legs. ASSESSMENT and PLAN    1. Essential (primary) hypertension, controlled, continue cozaar 25 mg daily  -     CBC; Future  -     Lipid Panel; Future  -     Comprehensive Metabolic Panel; Future  2. Iron deficiency anemia, unspecified iron deficiency anemia type, stable, continue ferrous sulfate @ current dose  -     CBC; Future  -     Iron and TIBC; Future  3. Other constipation, controlled, continue miralax and lactulose @ current dose  4. Prediabetes, stable, continue diet  -     Hemoglobin A1C; Future  -     Comprehensive Metabolic Panel; Future  5. Need for vaccination  -     Influenza, FLUCELVAX, (age 10 mo+), IM, Preservative Free, 0.5 mL  6. Colon cancer screening  -     Occult Blood Stool Immunoassay;  Future     Lab Results   Component Value Date    WBC 8.0 11/11/2022    HGB 14.5 11/11/2022    HCT 47.0 11/11/2022    MCV 99 11/11/2022     11/11/2022      Hemoglobin A1C   Date Value Ref Range Status   05/02/2023 6.1 (H) 4.8 - 5.6 %

## 2023-11-18 LAB
A/G RATIO: 1.3 RATIO (ref 1.1–2.6)
ALBUMIN SERPL-MCNC: 4.5 G/DL (ref 3.5–5)
ALP BLD-CCNC: 119 U/L (ref 25–115)
ALT SERPL-CCNC: 29 U/L (ref 5–40)
ANION GAP SERPL CALCULATED.3IONS-SCNC: 14 MMOL/L (ref 3–15)
AST SERPL-CCNC: 20 U/L (ref 10–37)
AVERAGE GLUCOSE: 127 MG/DL (ref 91–123)
BILIRUB SERPL-MCNC: 0.3 MG/DL (ref 0.2–1.2)
BUN BLDV-MCNC: 18 MG/DL (ref 6–22)
CALCIUM SERPL-MCNC: 9.9 MG/DL (ref 8.4–10.5)
CHLORIDE BLD-SCNC: 108 MMOL/L (ref 98–110)
CHOLESTEROL/HDL RATIO: 4.9 (ref 0–5)
CHOLESTEROL: 202 MG/DL (ref 110–200)
CO2: 22 MMOL/L (ref 20–32)
CREAT SERPL-MCNC: 0.8 MG/DL (ref 0.5–1.2)
GLOBULIN: 3.5 G/DL (ref 2–4)
GLOMERULAR FILTRATION RATE: >60 ML/MIN/1.73 SQ.M.
GLUCOSE: 101 MG/DL (ref 70–99)
HBA1C MFR BLD: 6.1 % (ref 4.8–5.6)
HCT VFR BLD CALC: 48.1 % (ref 35.1–48)
HDLC SERPL-MCNC: 41 MG/DL
HEMOGLOBIN: 14.9 G/DL (ref 11.7–16)
IRON % SATURATION: 25 % (ref 20–50)
IRON: 82 MCG/DL (ref 30–160)
LDL CHOLESTEROL CALCULATED: 123 MG/DL (ref 50–99)
LDL/HDL RATIO: 3
MCH RBC QN AUTO: 32 PG (ref 26–34)
MCHC RBC AUTO-ENTMCNC: 31 G/DL (ref 31–36)
MCV RBC AUTO: 102 FL (ref 80–99)
NON-HDL CHOLESTEROL: 161 MG/DL
PDW BLD-RTO: 13.2 % (ref 10–15.5)
PLATELET # BLD: 253 K/UL (ref 140–440)
PMV BLD AUTO: 11.7 FL (ref 9–13)
POTASSIUM SERPL-SCNC: 5.4 MMOL/L (ref 3.5–5.5)
RBC: 4.72 M/UL (ref 3.8–5.2)
SODIUM BLD-SCNC: 144 MMOL/L (ref 133–145)
TOTAL IRON BINDING CAPACITY: 333 MCG/DL (ref 228–428)
TOTAL PROTEIN: 8 G/DL (ref 6.4–8.3)
TRIGL SERPL-MCNC: 190 MG/DL (ref 40–149)
UIBC: 251 MCG/DL (ref 110–370)
VLDLC SERPL CALC-MCNC: 38 MG/DL (ref 8–30)
WBC: 5.6 K/UL (ref 4–11)

## 2024-03-06 ENCOUNTER — TELEPHONE (OUTPATIENT)
Dept: FAMILY MEDICINE CLINIC | Facility: CLINIC | Age: 49
End: 2024-03-06

## 2024-03-06 NOTE — TELEPHONE ENCOUNTER
Nurse from [facility] calling to update MD about pt change in condition. Pt was admitted with dehydration and possible sepsis.

## 2024-03-19 ENCOUNTER — OFFICE VISIT (OUTPATIENT)
Dept: FAMILY MEDICINE CLINIC | Facility: CLINIC | Age: 49
End: 2024-03-19
Payer: MEDICAID

## 2024-03-19 VITALS
RESPIRATION RATE: 18 BRPM | TEMPERATURE: 98.1 F | WEIGHT: 185.2 LBS | OXYGEN SATURATION: 95 % | DIASTOLIC BLOOD PRESSURE: 70 MMHG | HEIGHT: 62 IN | SYSTOLIC BLOOD PRESSURE: 100 MMHG | HEART RATE: 107 BPM | BODY MASS INDEX: 34.08 KG/M2

## 2024-03-19 DIAGNOSIS — Z87.440 RECENT URINARY TRACT INFECTION: Primary | ICD-10-CM

## 2024-03-19 DIAGNOSIS — I10 ESSENTIAL (PRIMARY) HYPERTENSION: ICD-10-CM

## 2024-03-19 PROCEDURE — 99213 OFFICE O/P EST LOW 20 MIN: CPT | Performed by: INTERNAL MEDICINE

## 2024-03-19 PROCEDURE — 3078F DIAST BP <80 MM HG: CPT | Performed by: INTERNAL MEDICINE

## 2024-03-19 PROCEDURE — 3074F SYST BP LT 130 MM HG: CPT | Performed by: INTERNAL MEDICINE

## 2024-03-19 NOTE — PROGRESS NOTES
Nettie Sullivan is a 48 y.o. female (: 1975) presenting to address:    No chief complaint on file.      Vitals:    24 1349   BP: 91/61   Pulse: (!) 107   Resp: 18   Temp: 98.1 °F (36.7 °C)   SpO2: 95%       \"Have you been to the ER, urgent care clinic since your last visit?  Hospitalized since your last visit?\"    NO    “Have you seen or consulted any other health care providers outside of [facility] since your last visit?”    NO        “Have you had a pap smear?”    NO    Date of last Cervical Cancer screen (HPV or PAP): 2016

## 2024-04-18 ENCOUNTER — TELEPHONE (OUTPATIENT)
Dept: FAMILY MEDICINE CLINIC | Facility: CLINIC | Age: 49
End: 2024-04-18

## 2024-04-18 NOTE — TELEPHONE ENCOUNTER
The nurse from [facility] called office to receive direction from provider concerning missed ABT medication dose. No new orders continue medication as prescribed

## 2024-05-08 ENCOUNTER — OFFICE VISIT (OUTPATIENT)
Dept: FAMILY MEDICINE CLINIC | Facility: CLINIC | Age: 49
End: 2024-05-08
Payer: MEDICAID

## 2024-05-08 VITALS
HEIGHT: 62 IN | HEART RATE: 107 BPM | RESPIRATION RATE: 16 BRPM | DIASTOLIC BLOOD PRESSURE: 82 MMHG | SYSTOLIC BLOOD PRESSURE: 116 MMHG | OXYGEN SATURATION: 94 % | BODY MASS INDEX: 34.01 KG/M2 | WEIGHT: 184.8 LBS | TEMPERATURE: 97.4 F

## 2024-05-08 DIAGNOSIS — I10 ESSENTIAL (PRIMARY) HYPERTENSION: ICD-10-CM

## 2024-05-08 DIAGNOSIS — D50.9 IRON DEFICIENCY ANEMIA, UNSPECIFIED IRON DEFICIENCY ANEMIA TYPE: ICD-10-CM

## 2024-05-08 DIAGNOSIS — Z00.00 ROUTINE GENERAL MEDICAL EXAMINATION AT A HEALTH CARE FACILITY: Primary | ICD-10-CM

## 2024-05-08 DIAGNOSIS — R73.03 PREDIABETES: ICD-10-CM

## 2024-05-08 PROCEDURE — 3074F SYST BP LT 130 MM HG: CPT | Performed by: INTERNAL MEDICINE

## 2024-05-08 PROCEDURE — 3079F DIAST BP 80-89 MM HG: CPT | Performed by: INTERNAL MEDICINE

## 2024-05-08 PROCEDURE — 99396 PREV VISIT EST AGE 40-64: CPT | Performed by: INTERNAL MEDICINE

## 2024-05-08 SDOH — ECONOMIC STABILITY: FOOD INSECURITY: WITHIN THE PAST 12 MONTHS, THE FOOD YOU BOUGHT JUST DIDN'T LAST AND YOU DIDN'T HAVE MONEY TO GET MORE.: NEVER TRUE

## 2024-05-08 SDOH — ECONOMIC STABILITY: INCOME INSECURITY: HOW HARD IS IT FOR YOU TO PAY FOR THE VERY BASICS LIKE FOOD, HOUSING, MEDICAL CARE, AND HEATING?: NOT HARD AT ALL

## 2024-05-08 SDOH — ECONOMIC STABILITY: FOOD INSECURITY: WITHIN THE PAST 12 MONTHS, YOU WORRIED THAT YOUR FOOD WOULD RUN OUT BEFORE YOU GOT MONEY TO BUY MORE.: NEVER TRUE

## 2024-05-08 ASSESSMENT — PATIENT HEALTH QUESTIONNAIRE - PHQ9
SUM OF ALL RESPONSES TO PHQ QUESTIONS 1-9: 0
SUM OF ALL RESPONSES TO PHQ9 QUESTIONS 1 & 2: 0
2. FEELING DOWN, DEPRESSED OR HOPELESS: NOT AT ALL
1. LITTLE INTEREST OR PLEASURE IN DOING THINGS: NOT AT ALL

## 2024-05-08 NOTE — PROGRESS NOTES
Nettie Sullivan is a 48 y.o. female (: 1975) presenting to address:    Chief Complaint   Patient presents with    Follow-up     H&P for the ICF       Vitals:    24 1015   BP: 116/82   Pulse: (!) 107   Resp: 16   Temp: 97.4 °F (36.3 °C)   SpO2: 94%       \"Have you been to the ER, urgent care clinic since your last visit?  Hospitalized since your last visit?\"    YES - When: approximately 2 months ago.  Where and Why: [facility] for UTI; [facility] ER for UTI.    “Have you seen or consulted any other health care providers outside of [facility] since your last visit?”    YES - When: approximately 5 months ago.  Where and Why: Dr. Aguillon, neurology.        “Have you had a pap smear?”    NO    Date of last Cervical Cancer screen (HPV or PAP): 2016           
[id number]  Component  Ref Range & Units 3/5/24 1533   TSH  0.27 - 4.20 mcU/mL 0.73   Resulting Agency [facility] LABORATORY     Specimen Collected: 03/05/24 15:33    Performed by: [facility] LABORAT[facility] Last Resulted: 03/05/24 16:28     Return in about 6 months (around 11/8/2024).

## 2024-10-15 NOTE — PROGRESS NOTES
HISTORY OF PRESENT ILLNESS  Nettie Sullivan is a 48 y.o. female    HPI  Patient is in today with her caregiver, patient is nonverbal not able to give any history,  Recent UTI, patient was in the hospital recently for altered mental status that was thought due to UTI, she was not treated with IV antibiotics then she was discharged home on Ceftin 500 mg twice daily for 7 days, she did complete that course 4 days ago, the hospital records reviewed today, patient is back to her baseline mental status.  Patient lives at intermediate care facility.  The hospital discharge summary reviewed today.  Hypertension stable, on Cozaar 25 mg daily.    Review of Systems   Unable to perform ROS: Patient nonverbal         Physical Exam  Vitals reviewed.   Cardiovascular:      Rate and Rhythm: Normal rate and regular rhythm.   Pulmonary:      Effort: Pulmonary effort is normal.      Breath sounds: Normal breath sounds.   Abdominal:      Palpations: Abdomen is soft.      Tenderness: There is no abdominal tenderness.   Musculoskeletal:      Right lower leg: No edema.      Left lower leg: No edema.   Neurological:      Comments: Pt is WC bound, looks comfortable, able to move her arms slightly, not able to move her legs.          ASSESSMENT and PLAN    1. Recent urinary tract infection, improved patient completed her antibiotic treatment, she is back to her baseline now according to the caregiver.  2. Essential (primary) hypertension, stable continue Cozaar 25 mg daily.     CBC  Order: [id number]  Component  Ref Range & Units 3/8/24 0509   WBC  4.0 - 11.0 K/uL 5.2   RBC  3.80 - 5.20 M/uL 4.04   HGB  11.7 - 16.0 g/dL 12.8   HCT  35.1 - 48.0 % 39.9   MCV  80 - 99 fL 99   MCH  26 - 34 pg 32   MCHC  31 - 36 g/dL 32   RDW  10.0 - 15.5 % 13.0   Platelet  140 - 440 K/uL 239   MPV  9.0 - 13.0 fL 11.0   Resulting Agency [facility] LABORATORY     Specimen Collected: 03/08/24 05:09    Performed by: [facility]  89.9

## 2024-11-12 ENCOUNTER — OFFICE VISIT (OUTPATIENT)
Dept: FAMILY MEDICINE CLINIC | Facility: CLINIC | Age: 49
End: 2024-11-12

## 2024-11-12 VITALS
OXYGEN SATURATION: 96 % | SYSTOLIC BLOOD PRESSURE: 110 MMHG | RESPIRATION RATE: 16 BRPM | DIASTOLIC BLOOD PRESSURE: 84 MMHG | HEIGHT: 62 IN | HEART RATE: 91 BPM | TEMPERATURE: 97.6 F | BODY MASS INDEX: 33.65 KG/M2

## 2024-11-12 DIAGNOSIS — R73.03 PREDIABETES: ICD-10-CM

## 2024-11-12 DIAGNOSIS — I10 ESSENTIAL (PRIMARY) HYPERTENSION: Primary | ICD-10-CM

## 2024-11-12 DIAGNOSIS — D50.9 IRON DEFICIENCY ANEMIA, UNSPECIFIED IRON DEFICIENCY ANEMIA TYPE: ICD-10-CM

## 2024-11-12 DIAGNOSIS — Z23 NEED FOR VACCINATION: ICD-10-CM

## 2024-11-12 DIAGNOSIS — I10 ESSENTIAL (PRIMARY) HYPERTENSION: ICD-10-CM

## 2024-11-12 NOTE — PROGRESS NOTES
HISTORY OF PRESENT ILLNESS  Nettie Sullivan is a 49 y.o. female    HPI  Patient is in today with her caregiver, patient is wheelchair-bound.  Hypertension, stable, blood pressure is controlled, she is on Cozaar 50 mg daily.  Iron deficiency anemia, controlled, she is on ferrous sulfate 325 mg every other day.  Prediabetes, stable, diet controlled.    Review of Systems   Unable to perform ROS: Patient nonverbal         Physical Exam  Vitals reviewed.   Cardiovascular:      Rate and Rhythm: Normal rate and regular rhythm.      Heart sounds: No murmur heard.  Pulmonary:      Effort: Pulmonary effort is normal.      Breath sounds: Normal breath sounds. No wheezing or rales.   Abdominal:      Palpations: Abdomen is soft.      Tenderness: There is no abdominal tenderness.   Musculoskeletal:      Right lower leg: No edema.      Left lower leg: No edema.   Neurological:      Comments: Pt is WC bound, looks comfortable, upper extremities motor strength is 2+/5, lower extremities motor strength is 0.          ASSESSMENT and PLAN    1. Essential (primary) hypertension, controlled, continue Cozaar 50 mg daily  -     CBC with Auto Differential; Future  -     Comprehensive Metabolic Panel; Future  -     Lipid Panel; Future  2. Prediabetes, stable, continue diet  -     CBC with Auto Differential; Future  -     Comprehensive Metabolic Panel; Future  -     Hemoglobin A1C; Future  -     Lipid Panel; Future  3. Iron deficiency anemia, unspecified iron deficiency anemia type, stable, continue iron at current dose  -     CBC with Auto Differential; Future  -     Ferritin; Future  -     Iron and TIBC; Future  4. Need for vaccination  -     Influenza, FLUCELVAX Trivalent, (age 6 mo+) IM, Preservative Free, 0.5mL         Return in about 6 months (around 5/9/2025) for Physical next visit.

## 2024-11-12 NOTE — PROGRESS NOTES
Nettie Sullivan is a 49 y.o. female (: 1975) presenting to address:    Chief Complaint   Patient presents with    Medication Check       Vitals:    24 1108   BP: (!) 112/90   Pulse: 91   Resp: 16   Temp: 97.6 °F (36.4 °C)   SpO2: 96%       \"Have you been to the ER, urgent care clinic since your last visit?  Hospitalized since your last visit?\"    NO    “Have you seen or consulted any other health care providers outside of [facility] since your last visit?”    YES - When: approximately 4 months ago.  Where and Why: neurology .       Have you had a mammogram?”   NO    No breast cancer screening on file      “Have you had a pap smear?”    NO    Date of last Cervical Cancer screen (HPV or PAP): 2016

## 2024-11-18 LAB
A/G RATIO: 1 RATIO (ref 1.1–2.6)
ALBUMIN: 4.3 G/DL (ref 3.5–5)
ALP BLD-CCNC: 104 U/L (ref 25–115)
ALT SERPL-CCNC: 63 U/L (ref 5–40)
ANION GAP SERPL CALCULATED.3IONS-SCNC: 12 MMOL/L (ref 3–15)
AST SERPL-CCNC: 43 U/L (ref 10–37)
BASOPHILS ABSOLUTE: 0 K/UL (ref 0–0.2)
BASOPHILS RELATIVE PERCENT: 1 % (ref 0–2)
BILIRUB SERPL-MCNC: 0.4 MG/DL (ref 0.2–1.2)
BUN BLDV-MCNC: 15 MG/DL (ref 6–22)
CALCIUM SERPL-MCNC: 10.7 MG/DL (ref 8.4–10.5)
CHLORIDE BLD-SCNC: 103 MMOL/L (ref 98–110)
CHOLESTEROL, TOTAL: 248 MG/DL (ref 110–200)
CHOLESTEROL/HDL RATIO: 6.5 (ref 0–5)
CO2: 29 MMOL/L (ref 20–32)
CREAT SERPL-MCNC: 0.7 MG/DL (ref 0.5–1.2)
EOSINOPHIL # BLD: 2 % (ref 0–6)
EOSINOPHILS ABSOLUTE: 0.2 K/UL (ref 0–0.5)
ESTIMATED AVERAGE GLUCOSE: 139 MG/DL (ref 91–123)
FERRITIN: 1204 NG/ML (ref 10–291)
GFR, ESTIMATED: >60 ML/MIN/1.73 SQ.M.
GLOBULIN: 4.2 G/DL (ref 2–4)
GLUCOSE: 122 MG/DL (ref 70–99)
HBA1C MFR BLD: 6.5 % (ref 4.8–5.6)
HCT VFR BLD CALC: 49.1 % (ref 35.1–48)
HDLC SERPL-MCNC: 38 MG/DL
HEMOGLOBIN: 15.5 G/DL (ref 11.7–16)
IRON % SATURATION: 26 % (ref 20–50)
IRON: 80 MCG/DL (ref 30–160)
LDL CHOLESTEROL: 158 MG/DL (ref 50–99)
LDL/HDL RATIO: 4.2
LYMPHOCYTES # BLD: 53 % (ref 20–45)
LYMPHOCYTES ABSOLUTE: 4.1 K/UL (ref 1–4.8)
MCH RBC QN AUTO: 31 PG (ref 26–34)
MCHC RBC AUTO-ENTMCNC: 32 G/DL (ref 31–36)
MCV RBC AUTO: 98 FL (ref 80–99)
MONOCYTES ABSOLUTE: 0.8 K/UL (ref 0.1–1)
MONOCYTES: 10 % (ref 3–12)
NEUTROPHILS ABSOLUTE: 2.6 K/UL (ref 1.8–7.7)
NEUTROPHILS: 34 % (ref 40–75)
NON-HDL CHOLESTEROL: 210 MG/DL
PDW BLD-RTO: 12.6 % (ref 10–15.5)
PLATELET # BLD: 256 K/UL (ref 140–440)
PMV BLD AUTO: 10.2 FL (ref 9–13)
POTASSIUM SERPL-SCNC: 4.4 MMOL/L (ref 3.5–5.5)
RBC # BLD: 5 M/UL (ref 3.8–5.2)
SODIUM BLD-SCNC: 144 MMOL/L (ref 133–145)
TOTAL IRON BINDING CAPACITY: 308 MCG/DL (ref 228–428)
TOTAL PROTEIN: 8.5 G/DL (ref 6.4–8.3)
TRIGL SERPL-MCNC: 260 MG/DL (ref 40–149)
UIBC: 228 MCG/DL (ref 110–370)
VLDLC SERPL CALC-MCNC: 52 MG/DL (ref 8–30)
WBC # BLD: 7.8 K/UL (ref 4–11)

## 2024-11-19 RX ORDER — METFORMIN HYDROCHLORIDE 500 MG/1
500 TABLET, EXTENDED RELEASE ORAL
Qty: 30 TABLET | Refills: 5 | Status: SHIPPED | OUTPATIENT
Start: 2024-11-19

## 2024-11-19 RX ORDER — ROSUVASTATIN CALCIUM 10 MG/1
10 TABLET, COATED ORAL DAILY
Qty: 30 TABLET | Refills: 3 | Status: SHIPPED | OUTPATIENT
Start: 2024-11-19

## 2025-03-10 DIAGNOSIS — N85.8 UTERINE MASS: Primary | ICD-10-CM

## 2025-03-20 NOTE — PROGRESS NOTES
Problem: Safety - Adult  Goal: Free from fall injury  Outcome: Progressing     Problem: Chronic Conditions and Co-morbidities  Goal: Patient's chronic conditions and co-morbidity symptoms are monitored and maintained or improved  Outcome: Progressing     Problem: Discharge Planning  Goal: Discharge to home or other facility with appropriate resources  Outcome: Progressing     Problem: Pain  Goal: Verbalizes/displays adequate comfort level or baseline comfort level  Outcome: Progressing     Problem: Skin/Tissue Integrity  Goal: Skin integrity remains intact  Description: 1.  Monitor for areas of redness and/or skin breakdown  2.  Assess vascular access sites hourly  3.  Every 4-6 hours minimum:  Change oxygen saturation probe site  4.  Every 4-6 hours:  If on nasal continuous positive airway pressure, respiratory therapy assess nares and determine need for appliance change or resting period  Outcome: Progressing      Hgb is much better, normal now  Iron level pending

## 2025-06-11 ENCOUNTER — HOSPITAL ENCOUNTER (OUTPATIENT)
Facility: HOSPITAL | Age: 50
Setting detail: SPECIMEN
Discharge: HOME | End: 2025-06-14
Payer: MEDICAID

## 2025-06-11 ENCOUNTER — OFFICE VISIT (OUTPATIENT)
Dept: FAMILY MEDICINE CLINIC | Facility: CLINIC | Age: 50
End: 2025-06-11
Payer: MEDICAID

## 2025-06-11 VITALS
RESPIRATION RATE: 16 BRPM | HEART RATE: 88 BPM | TEMPERATURE: 98.1 F | SYSTOLIC BLOOD PRESSURE: 120 MMHG | BODY MASS INDEX: 31.41 KG/M2 | HEIGHT: 64 IN | WEIGHT: 184 LBS | DIASTOLIC BLOOD PRESSURE: 78 MMHG | OXYGEN SATURATION: 97 %

## 2025-06-11 DIAGNOSIS — Z23 NEED FOR VACCINATION: ICD-10-CM

## 2025-06-11 DIAGNOSIS — R73.03 PREDIABETES: ICD-10-CM

## 2025-06-11 DIAGNOSIS — Z12.11 COLON CANCER SCREENING: ICD-10-CM

## 2025-06-11 DIAGNOSIS — Z00.00 ROUTINE GENERAL MEDICAL EXAMINATION AT A HEALTH CARE FACILITY: ICD-10-CM

## 2025-06-11 DIAGNOSIS — D50.9 IRON DEFICIENCY ANEMIA, UNSPECIFIED IRON DEFICIENCY ANEMIA TYPE: ICD-10-CM

## 2025-06-11 DIAGNOSIS — I10 ESSENTIAL (PRIMARY) HYPERTENSION: ICD-10-CM

## 2025-06-11 DIAGNOSIS — E78.2 MIXED HYPERLIPIDEMIA: ICD-10-CM

## 2025-06-11 DIAGNOSIS — Z00.00 ROUTINE GENERAL MEDICAL EXAMINATION AT A HEALTH CARE FACILITY: Primary | ICD-10-CM

## 2025-06-11 LAB
ALBUMIN SERPL-MCNC: 3.9 G/DL (ref 3.4–5)
ALBUMIN/GLOB SERPL: 1 (ref 0.8–1.7)
ALP SERPL-CCNC: 94 U/L (ref 45–117)
ALT SERPL-CCNC: 90 U/L (ref 10–35)
ANION GAP SERPL CALC-SCNC: 13 MMOL/L (ref 3–18)
AST SERPL-CCNC: 70 U/L (ref 10–38)
BASOPHILS # BLD: 0.03 K/UL (ref 0–0.1)
BASOPHILS NFR BLD: 0.5 % (ref 0–2)
BILIRUB SERPL-MCNC: 0.4 MG/DL (ref 0.2–1)
BUN SERPL-MCNC: 14 MG/DL (ref 6–23)
BUN/CREAT SERPL: 20 (ref 12–20)
CALCIUM SERPL-MCNC: 10.5 MG/DL (ref 8.5–10.1)
CHLORIDE SERPL-SCNC: 106 MMOL/L (ref 98–107)
CHOLEST SERPL-MCNC: 113 MG/DL
CO2 SERPL-SCNC: 26 MMOL/L (ref 21–32)
CREAT SERPL-MCNC: 0.72 MG/DL (ref 0.6–1.3)
DIFFERENTIAL METHOD BLD: ABNORMAL
EOSINOPHIL # BLD: 0.13 K/UL (ref 0–0.4)
EOSINOPHIL NFR BLD: 2 % (ref 0–5)
ERYTHROCYTE [DISTWIDTH] IN BLOOD BY AUTOMATED COUNT: 12.9 % (ref 11.6–14.5)
EST. AVERAGE GLUCOSE BLD GHB EST-MCNC: 145 MG/DL
GLOBULIN SER CALC-MCNC: 3.7 G/DL (ref 2–4)
GLUCOSE SERPL-MCNC: 94 MG/DL (ref 74–108)
HBA1C MFR BLD: 6.7 % (ref 4.2–5.6)
HCT VFR BLD AUTO: 46.2 % (ref 35–45)
HDLC SERPL-MCNC: 32 MG/DL (ref 40–60)
HDLC SERPL: 3.6 (ref 0–5)
HGB BLD-MCNC: 14.6 G/DL (ref 12–16)
IMM GRANULOCYTES # BLD AUTO: 0.01 K/UL (ref 0–0.04)
IMM GRANULOCYTES NFR BLD AUTO: 0.2 % (ref 0–0.5)
IRON SATN MFR SERPL: 22 %
IRON SERPL-MCNC: 67 UG/DL (ref 50–175)
LDLC SERPL CALC-MCNC: 51 MG/DL (ref 0–100)
LYMPHOCYTES # BLD: 3.7 K/UL (ref 0.9–3.6)
LYMPHOCYTES NFR BLD: 56.4 % (ref 21–52)
MCH RBC QN AUTO: 31.4 PG (ref 24–34)
MCHC RBC AUTO-ENTMCNC: 31.6 G/DL (ref 31–37)
MCV RBC AUTO: 99.4 FL (ref 78–100)
MONOCYTES # BLD: 0.63 K/UL (ref 0.05–1.2)
MONOCYTES NFR BLD: 9.6 % (ref 3–10)
NEUTS SEG # BLD: 2.06 K/UL (ref 1.8–8)
NEUTS SEG NFR BLD: 31.3 % (ref 40–73)
NRBC # BLD: 0.02 K/UL (ref 0–0.01)
NRBC BLD-RTO: 0.3 PER 100 WBC
PLATELET # BLD AUTO: 254 K/UL (ref 135–420)
PMV BLD AUTO: 10.7 FL (ref 9.2–11.8)
POTASSIUM SERPL-SCNC: 4.5 MMOL/L (ref 3.5–5.5)
PROT SERPL-MCNC: 7.5 G/DL (ref 6.4–8.2)
RBC # BLD AUTO: 4.65 M/UL (ref 4.2–5.3)
SODIUM SERPL-SCNC: 145 MMOL/L (ref 136–145)
TIBC SERPL-MCNC: 300 UG/DL (ref 250–450)
TRIGL SERPL-MCNC: 153 MG/DL (ref 0–150)
UIBC SERPL-MCNC: 233 UG/DL (ref 112–347)
VLDLC SERPL CALC-MCNC: 31 MG/DL
WBC # BLD AUTO: 6.6 K/UL (ref 4.6–13.2)

## 2025-06-11 PROCEDURE — 36415 COLL VENOUS BLD VENIPUNCTURE: CPT

## 2025-06-11 PROCEDURE — 85025 COMPLETE CBC W/AUTO DIFF WBC: CPT

## 2025-06-11 PROCEDURE — 80061 LIPID PANEL: CPT

## 2025-06-11 PROCEDURE — 3078F DIAST BP <80 MM HG: CPT | Performed by: INTERNAL MEDICINE

## 2025-06-11 PROCEDURE — 83550 IRON BINDING TEST: CPT

## 2025-06-11 PROCEDURE — 83540 ASSAY OF IRON: CPT

## 2025-06-11 PROCEDURE — 99396 PREV VISIT EST AGE 40-64: CPT | Performed by: INTERNAL MEDICINE

## 2025-06-11 PROCEDURE — 80053 COMPREHEN METABOLIC PANEL: CPT

## 2025-06-11 PROCEDURE — 3074F SYST BP LT 130 MM HG: CPT | Performed by: INTERNAL MEDICINE

## 2025-06-11 PROCEDURE — 83036 HEMOGLOBIN GLYCOSYLATED A1C: CPT

## 2025-06-11 RX ORDER — PNEUMOCOCCAL 20-VALENT CONJUGATE VACCINE 2.2; 2.2; 2.2; 2.2; 2.2; 2.2; 2.2; 2.2; 2.2; 2.2; 2.2; 2.2; 2.2; 2.2; 2.2; 2.2; 4.4; 2.2; 2.2; 2.2 UG/.5ML; UG/.5ML; UG/.5ML; UG/.5ML; UG/.5ML; UG/.5ML; UG/.5ML; UG/.5ML; UG/.5ML; UG/.5ML; UG/.5ML; UG/.5ML; UG/.5ML; UG/.5ML; UG/.5ML; UG/.5ML; UG/.5ML; UG/.5ML; UG/.5ML; UG/.5ML
0.5 INJECTION, SUSPENSION INTRAMUSCULAR ONCE
Qty: 0.5 ML | Refills: 0 | Status: SHIPPED | OUTPATIENT
Start: 2025-06-11 | End: 2025-06-11

## 2025-06-11 RX ORDER — ZOSTER VACCINE RECOMBINANT, ADJUVANTED 50 MCG/0.5
0.5 KIT INTRAMUSCULAR SEE ADMIN INSTRUCTIONS
Qty: 0.5 ML | Refills: 1 | Status: SHIPPED | OUTPATIENT
Start: 2025-06-11 | End: 2025-12-08

## 2025-06-11 SDOH — ECONOMIC STABILITY: FOOD INSECURITY: WITHIN THE PAST 12 MONTHS, YOU WORRIED THAT YOUR FOOD WOULD RUN OUT BEFORE YOU GOT MONEY TO BUY MORE.: PATIENT DECLINED

## 2025-06-11 SDOH — ECONOMIC STABILITY: FOOD INSECURITY: WITHIN THE PAST 12 MONTHS, THE FOOD YOU BOUGHT JUST DIDN'T LAST AND YOU DIDN'T HAVE MONEY TO GET MORE.: PATIENT DECLINED

## 2025-06-11 ASSESSMENT — PATIENT HEALTH QUESTIONNAIRE - PHQ9
2. FEELING DOWN, DEPRESSED OR HOPELESS: NOT AT ALL
1. LITTLE INTEREST OR PLEASURE IN DOING THINGS: NOT AT ALL
SUM OF ALL RESPONSES TO PHQ QUESTIONS 1-9: 0

## 2025-06-11 NOTE — PROGRESS NOTES
Nettie Sullivan is a 49 y.o. female (: 1975) presenting to address:    Chief Complaint   Patient presents with    Annual Exam       Vitals:    25 1312   BP: (!) 138/90   Pulse: 88   Resp: 16   Temp: 98.1 °F (36.7 °C)   SpO2: 97%       \"Have you been to the ER, urgent care clinic since your last visit?  Hospitalized since your last visit?\"    NO    “Have you seen or consulted any other health care providers outside of [facility] since your last visit?”    NO       Have you had a mammogram?”   NO    No breast cancer screening on file      “Have you had a pap smear?”    NO    Date of last Cervical Cancer screen (HPV or PAP): 2016           
Metabolic Panel; Future  -     Lipid Panel; Future  -     Hemoglobin A1C; Future  -     CBC with Auto Differential; Future  4. Iron deficiency anemia, unspecified iron deficiency anemia type, stable, will monitor hemoglobin and iron level  -     CBC with Auto Differential; Future  -     Iron and TIBC; Future  5. Need for vaccination, caregiver stated that they would check with her healthcare proxy which is the [employer] family before they decide to give her the vaccines, so prescription was given and they will either give her those 2 vaccines at the pharmacy or bring her back here for that  -     zoster recombinant adjuvanted vaccine (SHINGRIX) 50 MCG/0.5ML SUSR injection; Inject 0.5 mLs into the muscle See Admin Instructions 1 dose now and repeat in 2-6 months, Disp-0.5 mL, R-1Print  -     pneumococcal 20-valent conjugat (PREVNAR 20) 0.5 ML LAUREL inj; Inject 0.5 mLs into the muscle once for 1 dose, Disp-0.5 mL, R-0Print  6. Mixed hyperlipidemia, improving, continue Crestor at current dose  -     Comprehensive Metabolic Panel; Future  -     Lipid Panel; Future  7. Colon cancer screening  -     Occult Blood Stool Immunoassay; Future         Return in about 6 months (around 12/11/2025).

## 2025-06-12 ENCOUNTER — RESULTS FOLLOW-UP (OUTPATIENT)
Dept: FAMILY MEDICINE CLINIC | Facility: CLINIC | Age: 50
End: 2025-06-12

## 2025-06-18 LAB — HEMOCCULT STL QL IA: NEGATIVE

## (undated) DEVICE — KENDALL SCD EXPRESS SLEEVES, KNEE LENGTH, MEDIUM: Brand: KENDALL SCD

## (undated) DEVICE — REM POLYHESIVE ADULT PATIENT RETURN ELECTRODE: Brand: VALLEYLAB

## (undated) DEVICE — FLEX ADVANTAGE 3000CC: Brand: FLEX ADVANTAGE

## (undated) DEVICE — SYR 10ML LUER LOK 1/5ML GRAD --

## (undated) DEVICE — BLANKET WRM AD PREM MISTRAL-AIR

## (undated) DEVICE — SOL IRR GLYC 1.5 % 3000ML --

## (undated) DEVICE — Z DUP USE 2565107 PACK SURG PROC LEG CYSTO T-DRAPE REINF TBL CVR HND TWL

## (undated) DEVICE — GOWN,PREVENTION PLUS,XLN/XL,ST,24/CS: Brand: MEDLINE

## (undated) DEVICE — TUBING IRRIG L77IN DIA0.241IN L BOR FOR CYSTO W/ NVENT

## (undated) DEVICE — SYRINGE,TOOMEY,IRRIGATION,70CC,STERILE: Brand: MEDLINE

## (undated) DEVICE — TRAY PREP DRY W/ PREM GLV 2 APPL 6 SPNG 2 UNDPD 1 OVERWRAP

## (undated) DEVICE — SOLUTION IRRIG 3000ML 0.9% SOD CHL FLX CONT 0797208] ICU MEDICAL INC]

## (undated) DEVICE — GAUZE,SPONGE,8"X4",12PLY,XRAY,STRL,LF: Brand: MEDLINE